# Patient Record
Sex: MALE | Race: WHITE | NOT HISPANIC OR LATINO | Employment: OTHER | URBAN - METROPOLITAN AREA
[De-identification: names, ages, dates, MRNs, and addresses within clinical notes are randomized per-mention and may not be internally consistent; named-entity substitution may affect disease eponyms.]

---

## 2019-10-01 ENCOUNTER — TELEPHONE (OUTPATIENT)
Dept: RHEUMATOLOGY | Facility: CLINIC | Age: 84
End: 2019-10-01

## 2019-10-01 ENCOUNTER — PATIENT MESSAGE (OUTPATIENT)
Dept: RHEUMATOLOGY | Facility: CLINIC | Age: 84
End: 2019-10-01

## 2019-10-01 NOTE — TELEPHONE ENCOUNTER
----- Message from Dania Philip sent at 10/1/2019  1:49 PM CDT -----  Contact: Pt  Pt is requesting call back in regards to questions about referral.          Pls call back at [phone]

## 2019-10-01 NOTE — TELEPHONE ENCOUNTER
Called pt back to inform him we can't do a 30 min visit has to be an hour. Pt verbalized understanding.

## 2019-10-07 ENCOUNTER — PATIENT MESSAGE (OUTPATIENT)
Dept: RHEUMATOLOGY | Facility: CLINIC | Age: 84
End: 2019-10-07

## 2019-10-08 ENCOUNTER — HOSPITAL ENCOUNTER (OUTPATIENT)
Dept: RADIOLOGY | Facility: HOSPITAL | Age: 84
Discharge: HOME | End: 2019-10-08
Attending: INTERNAL MEDICINE
Payer: MEDICARE

## 2019-10-08 ENCOUNTER — OFFICE VISIT (OUTPATIENT)
Dept: RHEUMATOLOGY | Facility: CLINIC | Age: 84
End: 2019-10-08
Payer: MEDICARE

## 2019-10-08 VITALS
BODY MASS INDEX: 21.36 KG/M2 | WEIGHT: 144.19 LBS | HEART RATE: 69 BPM | SYSTOLIC BLOOD PRESSURE: 124 MMHG | HEIGHT: 69 IN | DIASTOLIC BLOOD PRESSURE: 84 MMHG

## 2019-10-08 DIAGNOSIS — R76.8 RHEUMATOID FACTOR POSITIVE: Primary | ICD-10-CM

## 2019-10-08 DIAGNOSIS — M15.9 PRIMARY OSTEOARTHRITIS INVOLVING MULTIPLE JOINTS: ICD-10-CM

## 2019-10-08 DIAGNOSIS — R79.82 ELEVATED C-REACTIVE PROTEIN (CRP): ICD-10-CM

## 2019-10-08 PROCEDURE — 72040 XR CERVICAL SPINE AP LATERAL: ICD-10-PCS | Mod: 26,,, | Performed by: RADIOLOGY

## 2019-10-08 PROCEDURE — 99205 PR OFFICE/OUTPT VISIT, NEW, LEVL V, 60-74 MIN: ICD-10-PCS | Mod: S$GLB,,, | Performed by: INTERNAL MEDICINE

## 2019-10-08 PROCEDURE — 99999 PR PBB SHADOW E&M-EST. PATIENT-LVL III: CPT | Mod: PBBFAC,,, | Performed by: INTERNAL MEDICINE

## 2019-10-08 PROCEDURE — 72040 X-RAY EXAM NECK SPINE 2-3 VW: CPT | Mod: 26,,, | Performed by: RADIOLOGY

## 2019-10-08 PROCEDURE — 1100F PR PT FALLS ASSESS DOC 2+ FALLS/FALL W/INJURY/YR: ICD-10-PCS | Mod: CPTII,S$GLB,, | Performed by: INTERNAL MEDICINE

## 2019-10-08 PROCEDURE — 1100F PTFALLS ASSESS-DOCD GE2>/YR: CPT | Mod: CPTII,S$GLB,, | Performed by: INTERNAL MEDICINE

## 2019-10-08 PROCEDURE — 99205 OFFICE O/P NEW HI 60 MIN: CPT | Mod: S$GLB,,, | Performed by: INTERNAL MEDICINE

## 2019-10-08 PROCEDURE — 3288F PR FALLS RISK ASSESSMENT DOCUMENTED: ICD-10-PCS | Mod: CPTII,S$GLB,, | Performed by: INTERNAL MEDICINE

## 2019-10-08 PROCEDURE — 72040 X-RAY EXAM NECK SPINE 2-3 VW: CPT | Mod: TC

## 2019-10-08 PROCEDURE — 99999 PR PBB SHADOW E&M-EST. PATIENT-LVL III: ICD-10-PCS | Mod: PBBFAC,,, | Performed by: INTERNAL MEDICINE

## 2019-10-08 PROCEDURE — 3288F FALL RISK ASSESSMENT DOCD: CPT | Mod: CPTII,S$GLB,, | Performed by: INTERNAL MEDICINE

## 2019-10-08 RX ORDER — IPRATROPIUM BROMIDE 42 UG/1
2 SPRAY, METERED NASAL 2 TIMES DAILY PRN
COMMUNITY

## 2019-10-08 RX ORDER — GABAPENTIN 100 MG/1
100 CAPSULE ORAL NIGHTLY PRN
Qty: 30 CAPSULE | Refills: 3 | Status: SHIPPED | OUTPATIENT
Start: 2019-10-08 | End: 2019-11-07

## 2019-10-08 RX ORDER — APIXABAN 5 MG/1
5 TABLET, FILM COATED ORAL 2 TIMES DAILY
Refills: 11 | COMMUNITY
Start: 2019-09-04 | End: 2021-05-28

## 2019-10-08 NOTE — PATIENT INSTRUCTIONS
Gabapentin capsules or tablets  What is this medicine?  GABAPENTIN (GA ba pen tin) is used to control partial seizures in adults with epilepsy. It is also used to treat certain types of nerve pain.  How should I use this medicine?  Take this medicine by mouth with a glass of water. Follow the directions on the prescription label. You can take it with or without food. If it upsets your stomach, take it with food.Take your medicine at regular intervals. Do not take it more often than directed. Do not stop taking except on your doctor's advice.  If you are directed to break the 600 or 800 mg tablets in half as part of your dose, the extra half tablet should be used for the next dose. If you have not used the extra half tablet within 28 days, it should be thrown away.  A special MedGuide will be given to you by the pharmacist with each prescription and refill. Be sure to read this information carefully each time.  Talk to your pediatrician regarding the use of this medicine in children. Special care may be needed.  What side effects may I notice from receiving this medicine?  Side effects that you should report to your doctor or health care professional as soon as possible:  · allergic reactions like skin rash, itching or hives, swelling of the face, lips, or tongue  · worsening of mood, thoughts or actions of suicide or dying  Side effects that usually do not require medical attention (report to your doctor or health care professional if they continue or are bothersome):  · constipation  · difficulty walking or controlling muscle movements  · dizziness  · nausea  · slurred speech  · tiredness  · tremors  · weight gain  What may interact with this medicine?  Do not take this medicine with any of the following medications:  · other gabapentin products  This medicine may also interact with the following medications:  · alcohol  · antacids  · antihistamines for allergy, cough and cold  · certain medicines for anxiety or  sleep  · certain medicines for depression or psychotic disturbances  · homatropine; hydrocodone  · naproxen  · narcotic medicines (opiates) for pain  · phenothiazines like chlorpromazine, mesoridazine, prochlorperazine, thioridazine  What if I miss a dose?  If you miss a dose, take it as soon as you can. If it is almost time for your next dose, take only that dose. Do not take double or extra doses.  Where should I keep my medicine?  Keep out of reach of children.  This medicine may cause accidental overdose and death if it taken by other adults, children, or pets. Mix any unused medicine with a substance like cat litter or coffee grounds. Then throw the medicine away in a sealed container like a sealed bag or a coffee can with a lid. Do not use the medicine after the expiration date.  Store at room temperature between 15 and 30 degrees C (59 and 86 degrees F).  What should I tell my health care provider before I take this medicine?  They need to know if you have any of these conditions:  · kidney disease  · suicidal thoughts, plans, or attempt; a previous suicide attempt by you or a family member  · an unusual or allergic reaction to gabapentin, other medicines, foods, dyes, or preservatives  · pregnant or trying to get pregnant  · breast-feeding  What should I watch for while using this medicine?  Visit your doctor or health care professional for regular checks on your progress. You may want to keep a record at home of how you feel your condition is responding to treatment. You may want to share this information with your doctor or health care professional at each visit. You should contact your doctor or health care professional if your seizures get worse or if you have any new types of seizures. Do not stop taking this medicine or any of your seizure medicines unless instructed by your doctor or health care professional. Stopping your medicine suddenly can increase your seizures or their severity.  Wear a medical  identification bracelet or chain if you are taking this medicine for seizures, and carry a card that lists all your medications.  You may get drowsy, dizzy, or have blurred vision. Do not drive, use machinery, or do anything that needs mental alertness until you know how this medicine affects you. To reduce dizzy or fainting spells, do not sit or stand up quickly, especially if you are an older patient. Alcohol can increase drowsiness and dizziness. Avoid alcoholic drinks.  Your mouth may get dry. Chewing sugarless gum or sucking hard candy, and drinking plenty of water will help.  The use of this medicine may increase the chance of suicidal thoughts or actions. Pay special attention to how you are responding while on this medicine. Any worsening of mood, or thoughts of suicide or dying should be reported to your health care professional right away.  Women who become pregnant while using this medicine may enroll in the [place] Antiepileptic Drug Pregnancy Registry by calling 1-885.974.1358. This registry collects information about the safety of antiepileptic drug use during pregnancy.  NOTE:This sheet is a summary. It may not cover all possible information. If you have questions about this medicine, talk to your doctor, pharmacist, or health care provider. Copyright© 2017 [employer]        General Neck and Back Pain    Both neck and back pain are usually caused by injury to the muscles or ligaments of the spine. Sometimes the disks that separate each bone of the spine may cause pain by pressing on a nearby nerve. Back and neck pain may appear after a sudden twisting or bending force (such as in a car accident), or sometimes after a simple awkward movement. In either case, muscle spasm is often present and adds to the pain.  Acute neck and back pain usually gets better in 1 to 2 weeks. Pain related to disk disease, arthritis in the spinal joints or spinal stenosis (narrowing of the spinal canal) can become  chronic and last for months or years.  Back and neck pain are common problems. Most people feel better in 1 or 2 weeks, and most of the rest in 1 to 2 months. Most people can remain active.  People experience and describe pain differently.  · Pain can be sharp, stabbing, shooting, aching, cramping, or burning  · Movement, standing, bending, lifting, sitting, or walking may worsen the pain  · Pain can be localized to one spot or area, or it can be more generalized  · Pain can spread or radiate upwards, downwards, to the front, or go down your arms  · Muscle spasm may occur.  Most of the time mechanical problems with the muscles or spine cause the pain. it is usually caused by an injury, whether known or not, to the muscles or ligaments. While illnesses can cause back pain, it is usually not caused by a serious illness. Pain is usually related to physical activity, whether sports, exercise, work, or normal activity. Sometimes it can occur without an identifiable cause. This can happen simply by stretching or moving wrong, without noting pain at the time. Other causes include:  · Overexertion, lifting, pushing, pulling incorrectly or too aggressively.  · Sudden twisting, bending or stretching from an accident (car or fall), or accidental movement.  · Poor posture  · Poor conditioning, lack of regular exercise  · Spinal disc disease or arthritis  · Stress  · Pregnancy, or illness like appendicitis, bladder or kidney infection, pelvic infections   Home care  · For neck pain: Use a comfortable pillow that supports the head and keeps the spine in a neutral position. The position of the head should not be tilted forward or backward.  · When in bed, try to find a position of comfort. A firm mattress is best. Try lying flat on your back with pillows under your knees. You can also try lying on your side with your knees bent up towards your chest and a pillow between your knees.  · At first, do not try to stretch out the sore  spots. If there is a strain, it is not like the good soreness you get after exercising without an injury. In this case, stretching may make it worse.  · Avoid prolonged sitting, long car rides or travel. This puts more stress on the lower back than standing or walking.  · During the first 24 to 72 hours after an injury, apply an ice pack to the painful area for 20 minutes and then remove it for 20 minutes over a period of 60 to 90 minutes or several times a day.   · You can alternate ice and heat therapies. Talk with your healthcare provider about the best treatment for your back or neck pain. As a safety precaution, do not use a heating pad at bedtime. Sleeping with a heating pad can lead to skin burns or tissue damage.  · Therapeutic massage can help relax the back and neck muscles without stretching them.  · Be aware of safe lifting methods and do not lift anything over 15 pounds until all the pain is gone.  Medications  Talk to your healthcare provider before using medicine, especially if you have other medical problems or are taking other medicines.  · You may use over-the-counter medicine to control pain, unless another pain medicine was prescribed. If you have chronic conditions like diabetes, liver or kidney disease, stomach ulcers,  gastrointestinal bleeding, or are taking blood thinner medicines.  · Be careful if you are given pain medicines, narcotics, or medicine for muscle spasm. They can cause drowsiness, and can affect your coordination, reflexes, and judgment. Do not drive or operate heavy machinery.  Follow-up care  Follow up with your healthcare provider, or as advised. Physical therapy or further tests may be needed.  If X-rays were taken, you will be notified of any new findings that may affect your care.  Call 911  Seek emergency medical care if any of the following occur:  · Trouble breathing  · Confusion  · Very drowsy or trouble awakening  · Fainting or loss of consciousness  · Rapid or very  slow heart rate  · Loss of bowel or bladder control  When to seek medical advice  Call your healthcare provider right away if any of these occur:  · Pain becomes worse or spreads into your arms or legs  · Weakness, numbness or pain in one or both arms or legs  · Numbness in the groin area  · Difficulty walking  · Fever of 100.4ºF (38ºC) or higher, or as directed by your healthcare provider  Date Last Reviewed: 7/1/2016 © 2000-2017 [employer]. [address] [place] [address]. All rights reserved. This information is not intended as a substitute for professional medical care. Always follow your healthcare professional's instructions.        Understanding Polymyalgia Rheumatica  Polymyalgia rheumatica (PMR) is an inflammatory condition that can cause aching and stiffness. It tends to affect the neck, shoulders, and hips. The aching and stiffness are usually worse in the morning.  PMR can come on suddenly. For some it seems to occur overnight. For others it can take days or weeks to develop. PMR affects only older adults. It becomes more common with age. PMR occurs most often between the ages of 70 and 80. It is more common in women than in men, and it seems to run in some families.  What causes polymyalgia rheumatica?  Researchers are working to understand the causes of PMR. Because it can happen quickly and tends to occur at certain times of year, some think that an infection may cause it. Genes may be part of the cause. PMR can run in some families.  Symptoms of polymyalgia rheumatica  The main symptoms of PMR are aching and stiffness of the shoulders, neck, and hips. The aching can extend to the upper arms and thighs. PMR tends to affect both sides of the body equally. Symptoms are often worse in the morning or after long periods of no activity. Movement can make the pain worse.  The symptoms of PMR usually affect the shoulders the most. You may have trouble raising your arms above the  level of your shoulders. This can make it hard to get dressed. You may have trouble rolling over in bed, getting out of bed, and getting up from sitting. You may also have trouble sleeping because of your symptoms.  Other symptoms can occur, such as:  · Swelling of the hands, wrists, feet, and ankles  · Numbness, tingling, or pain in the hand, wrist, or forearm  · Feeling of weakness  · General feeling of being unwell  · Feeling tired  · Loss of appetite  · Weight loss  · Low-grade fever  Diagnosing polymyalgia rheumatica  Your healthcare provider will ask about your health history and your symptoms. He or she will give you a physical exam. The exam will check your range of motion, strength, and painful areas.  Diagnosing PMR can be difficult. Your healthcare provider will need to make sure you have PMR. Many conditions can cause aching and stiffness. These include rheumatoid arthritis and fibromyalgia. You may need tests, such as:  · Blood tests to look for signs of inflammation, blood count problems, and muscle damage  · Muscle biopsy to check for damage  · Biopsy of a blood vessel in your temple  · X-rays to look at your joints  · MRI for detailed pictures of your joints and tissues  · Ultrasound to look most closely at the soft tissues around your joints  Your healthcare provider may also diagnose you by giving you medicine. PMR often responds quickly to steroid medicine. This can help show if you have PMR. You may also be referred to a rheumatologist for diagnosis.  Date Last Reviewed: 6/10/2015  © 7112-6395 [employer]. [address] [place] [address], [place] [address]. All rights reserved. This information is not intended as a substitute for professional medical care. Always follow your healthcare professional's instructions.        Treatment for Polymyalgia Rheumatica  Polymyalgia rheumatica (PMR) is an inflammatory condition that can cause aching and stiffness. It tends to affect the neck, shoulders,  and hips. The aching and stiffness are usually worse in the morning.  Types of treatment  Steroid medicine is the main treatment for PMR. Your healthcare provider will start you on a low dose of this medicine. You should start to feel better soon after starting. When your symptoms are better, your healthcare provider will slowly lower the amount of medicine. If your symptoms return, he or she will increase the dose. You may need to take steroid medicine for a few years. Return of symptoms is common, so you may need to take steroid medicine again in the future. If untreated, PMR may go away on its own after several years. But symptoms will likely return.  Watching for giant cell arteritis  Some people with PMR also have a condition called giant cell arteritis. It is also called temporal arteritis or Painter arteritis. This is inflammation of blood vessels in the head, neck, and arms. This can narrow or block the blood vessels. It can cause problems from less blood flow through those vessels. Giant cell arteritis can cause symptoms such as:  · Headaches  · Changes in vision  · Jaw pain, especially when chewing  · Scalp pain  · Scalp sores (ulcers)  · High fevers  Possible complications of giant cell arteritis may include blindness or stroke. Giant cell arteritis can also be treated with steroid medicine.  Risks of long-term steroid use  Steroid medicine has some risks. Talk with your healthcare provider about the risks and benefits for you. Some of the possible risks of taking steroids for a long time can include:  · High blood pressure  · Diabetes  · Glaucoma  · Cataracts  · Osteoporosis  · Fluid retention  · Weight gain  · Roundness of the face  · Stomach irritation  · Trouble sleeping  · Muscle wasting  · Skin thinning  · Easy bruising  · Poor wound healing  · Higher risk for infections  Living with polymyalgia rheumatica  If you have PMR, your symptoms will get better with treatment. Once you start feeling better,  you can return to your normal activities. Your healthcare provider will track your symptoms and adjust your steroid dose until you are on the lowest dose needed. Small changes in steroid doses can have a big effect on your symptoms. Make sure to follow your healthcare providers instructions.  When to call 911  Call 911 if you have symptoms of a stroke, such as:  · Severe headache  · Trouble seeing  · Balance or coordination problems  · Weakness or numbness  · Confusion  · Trouble speaking  If you think you are having a stroke, note the time when your symptoms started.      When to call your healthcare provider  Call your healthcare provider if you have any of the following:  · Symptoms that dont get better with treatment  · Symptoms that get worse  · Symptoms of giant cell arteritis   Date Last Reviewed: 8/10/2015  © 7225-8556 [employer]. [address] [place] [address], [place] [address]. All rights reserved. This information is not intended as a substitute for professional medical care. Always follow your healthcare professional's instructions.

## 2019-10-08 NOTE — LETTER
October 8, 2019      Issac Edgar Jr., MD  [address] [facility]  [place] [address]           [facility]  [address] [facility]  [place] [address]  Phone: [phone]  Fax: [phone]          Patient: Mars Armenta   MR Number: [id number]   YOB: 1931   Date of Visit: 10/8/2019       Dear Dr. Issac Edgar Jr.:    Thank you for referring Mars Armenta to me for evaluation. Attached you will find relevant portions of my assessment and plan of care.    If you have questions, please do not hesitate to call me. I look forward to following Mars Armenta along with you.    Sincerely,    Bola Jolley MD    Enclosure  CC:  No Recipients    If you would like to receive this communication electronically, please contact [email or web address] or [phone] to request more information on [employer] [facility] access.    For providers and/or their staff who would like to refer a patient to [facility], please contact us through our one-stop-shop provider referral line, [facility], at [phone].    If you feel you have received this communication in error or would no longer like to receive these types of communications, please e-mail [email or web address]

## 2019-10-08 NOTE — PROGRESS NOTES
"     RHEUMATOLOGY OUTPATIENT CLINIC NOTE    10/8/2019    Attending Rheumatologist: Bola Jolley  Primary Care Provider: Issac Edgar Jr, MD   Physician Requesting Consultation: Issac Edgar Jr., MD  [address] [facility]  [facility]  [place] [id number]  Chief Complaint/Reason For Consultation:  Joint pain.    Subjective:       HPI  Mars Armenta is a 88 y.o. White male with episode of neck pain and left upper extremity swelling comes for rheumatic evaluation.  Describes persistent episode of neck pain with "pain and needles characteristic" with radiation down his left arm.  Worst in the evening, aggravated by range of motion, improved somewhat by rest without significant relief from OTC pain medication.  Denies association with prolonged morning stiffness, jaw claudication, fever, or persistent paresthesias/other focal neurological symptoms.     Review of Systems   Constitutional: Negative for chills, fever and malaise/fatigue.   Eyes: Negative for pain and redness.   Respiratory: Negative for cough, hemoptysis and shortness of breath.    Cardiovascular: Negative for chest pain and leg swelling.   Gastrointestinal: Negative for abdominal pain, blood in stool and melena.   Genitourinary: Negative for dysuria and hematuria.   Musculoskeletal: Positive for joint pain (left shoulder, mixed pattern mostly consistent with DJD.) and neck pain (neuropathic features, No alarm signs or symptoms.). Negative for falls.   Skin: Negative for rash.   Neurological: Negative for tingling, focal weakness and weakness.   Psychiatric/Behavioral: Negative for memory loss. The patient does not have insomnia.      Chronic comorbid conditions affecting medical decision making today:  · Osteoporosis on Boniva per PMD  · TIA?  · On chronic AC with Eliquis  · Hx of lumbar  Laminectomy  · Prostate CA    History reviewed. No pertinent past medical history.  History reviewed. No pertinent surgical history.  History reviewed. No " "pertinent family history.  Social History     Substance and Sexual Activity   Alcohol Use Never    Frequency: Never     Social History     Tobacco Use   Smoking Status Never Smoker     Social History     Substance and Sexual Activity   Drug Use Not on file       Current Outpatient Medications:     ELIQUIS 5 mg Tab, Take 5 mg by mouth 2 (two) times daily., Disp: , Rfl: 11    ipratropium (ATROVENT) 42 mcg (0.06 %) nasal spray, 2 sprays by Nasal route 2 (two) times daily as needed for Rhinitis., Disp: , Rfl:     tetrahydrozoline HCl/zinc sulf (EYE DROPS IRRITATION RELIEF OPHT), Apply to eye., Disp: , Rfl:     gabapentin (NEURONTIN) 100 MG capsule, Take 1 capsule (100 mg total) by mouth nightly as needed., Disp: 30 capsule, Rfl: 3     Objective:         Vitals:    10/08/19 1425   BP: 124/84   Pulse: 69     Physical Exam   Constitutional: No distress.   Estimated body mass index is 21.29 kg/m² as calculated from the following:    Height as of this encounter: 5' 9" (1.753 m).    Weight as of this encounter: 65.4 kg (144 lb 2.9 oz).    Wt Readings from Last 1 Encounters:  10/08/19 1425 : 65.4 kg (144 lb 2.9 oz)     HENT:   Head: Normocephalic and atraumatic.   Eyes: Conjunctivae are normal. Pupils are equal, round, and reactive to light.   Neck: Normal range of motion.   Cardiovascular: Normal rate and intact distal pulses.    Pulmonary/Chest: Effort normal. No respiratory distress.   Abdominal: Soft. He exhibits no distension.   Neurological: He is alert. Gait normal.   Skin: No rash noted. No erythema.     Musculoskeletal: Normal range of motion. He exhibits deformity (Slight Erinn's.  Thumb CMC squaring.  Slight Boutonnière appearance 5th PIP right more than left.). He exhibits no edema or tenderness.   : strong  No active synovitis or significant squeeze tenderness    AROM: intact  PROM: intact    Devices used by patient: none       Reviewed old and all outside pertinent medical records available.    All " lab results personally reviewed and interpreted by me.  Lab Results   Component Value Date    WBC 4.54 (L) 07/18/2006    HGB 15.5 07/18/2006    HCT 47.3 07/18/2006    MCV 94.6 07/18/2006    MCH 31.0 07/18/2006    MCHC 32.8 07/18/2006    RDW 14.4 07/18/2006     07/18/2006    MPV 11.5 07/18/2006       Lab Results   Component Value Date     08/30/2006    K 4.8 08/30/2006     08/30/2006    CO2 26 08/30/2006    GLU 91 08/30/2006    BUN 20 08/30/2006    CALCIUM 9.5 08/30/2006    PROT 7.0 07/18/2006    ALBUMIN 4.6 07/18/2006    BILITOT 0.9 07/18/2006    AST 37 07/18/2006    ALKPHOS 83 07/18/2006    ALT 39 07/18/2006       No results found for: COLORU, APPEARANCEUA, SPECGRAV, PHUR, PROTEINUA, GLUCOSEU, KETONESU, BLOODU, LEUKOCYTESUR, NITRITE, UROBILINOGEN    No results found for: CRP    Lab Results   Component Value Date    SEDRATE 1 12/07/2005       Lab Results   Component Value Date    SEDRATE 1 12/07/2005       No components found for: 25OHVITDTOT, 90HSOJDJ9, 23GRVJYJ7, METHODNOTE    No results found for: URICACID    No components found for: TSPOTTB    (provided by patient 8/2019)  CBC, CMP: grossly unremarkable    Rheum Labs:   HELEN negative    (ref <14)   CCP negative   ESR 33   .7     Infectious Labs:   n/a     Imaging:  All imaging reviewed and independently  interpreted by me.    n/a     ASSESSMENT / PLAN:     Mars Armenta is a 88 y.o. White male with:    1.  Abnormal lab results  - Elevated rheumatoid factor and CRP ( provided by patient)  - Currently with no features of active inflammatory arthritis  - Mixed pattern of joint pain, mostly consistent with degenerative disc disease with radiculopathy features  - Describes prescribed systemic CS, last taken 1 week ago approximately with good results  - given current presentation, will not resume PDN or provide with DMARDs at this time  - if re-occurrence of joint swelling or severe pain, will repeat APRs and consider DMARDs  -  symptomatic management for neck pain with radiculopathy features  - trial of low-dose gabapentin p.r.n. Clinical significance side effects of therapy discussed in detail.  - imaging to assess for chronic inflammatory arthritic changes  - gabapentin (NEURONTIN) 100 MG capsule; Take 1 capsule (100 mg total) by mouth nightly as needed.  Dispense: 30 capsule; Refill: 3  - X-Ray Cervical Spine AP And Lateral; Future    2. Other specified counseling  - over 10 minutes spent regarding below topics:  - Immunization counseling done.  - Weight loss counseling done.  - Nutrition and exercise counseling.  - Limitation of alcohol consumption.  - Regular exercise:  Aerobic and resistance.  - Medication counseling provided.    Follow up in about 3 months (around 1/8/2020).    Method of contact with patient concerns: [facility]    Disclaimer:  This note is prepared using voice recognition software and as such is likely to have errors and has not been proof read. Please contact me for questions.     Time spent: 60 minutes in face to face discussion concerning diagnosis, prognosis, review of lab and test results, benefits of treatment as well as management of disease, counseling of patient and coordination of care between various health care providers.  Greater than half the time spent was used for coordination of care and counseling of patient.    Bola Jolley M.D.  Rheumatology Department   [facility]

## 2019-10-09 ENCOUNTER — TELEPHONE (OUTPATIENT)
Dept: RHEUMATOLOGY | Facility: CLINIC | Age: 84
End: 2019-10-09

## 2019-10-09 NOTE — TELEPHONE ENCOUNTER
Called pt and advised him imaging shows degenerative disc disease and he should continue the plan as discussed with Dr. Jolley. Pt verbalized understanding.

## 2019-10-09 NOTE — TELEPHONE ENCOUNTER
----- Message from Bola Jolley MD sent at 10/9/2019 11:30 AM CDT -----  Please contact the patient and inform I reviewed the imaging.  Findings mostly consistent with degenerative disc disease.  We can continue with the plan discussed on our previous encounter.  For any questions or concerns, do not hesitate to contact me; and have the patient call the office or send a message through the patient portal for any concerns.    Thank you very much!    Sincerely,    Bola Jolley  Rheumatology Department   [facility]

## 2019-11-14 ENCOUNTER — PATIENT MESSAGE (OUTPATIENT)
Dept: RHEUMATOLOGY | Facility: CLINIC | Age: 84
End: 2019-11-14

## 2020-01-02 ENCOUNTER — TELEPHONE (OUTPATIENT)
Dept: RHEUMATOLOGY | Facility: CLINIC | Age: 85
End: 2020-01-02

## 2020-01-02 NOTE — TELEPHONE ENCOUNTER
Spoke with patient. States that he had labs 2 wks ago by his Orthopedists. States he will bring copy for his appointment next week .

## 2020-01-02 NOTE — TELEPHONE ENCOUNTER
----- Message from Tomasz Fung sent at 1/2/2020  8:43 AM CST -----  Contact: self [phone]  Pt would like return call from nurse regarding labs.   Please call back at [phone].  Md Ritchie

## 2020-01-07 ENCOUNTER — OFFICE VISIT (OUTPATIENT)
Dept: RHEUMATOLOGY | Facility: CLINIC | Age: 85
End: 2020-01-07
Payer: MEDICARE

## 2020-01-07 VITALS
SYSTOLIC BLOOD PRESSURE: 120 MMHG | WEIGHT: 149.94 LBS | BODY MASS INDEX: 22.21 KG/M2 | OXYGEN SATURATION: 99 % | HEART RATE: 60 BPM | HEIGHT: 69 IN | DIASTOLIC BLOOD PRESSURE: 60 MMHG

## 2020-01-07 DIAGNOSIS — M05.9 SEROPOSITIVE RHEUMATOID ARTHRITIS: Primary | ICD-10-CM

## 2020-01-07 DIAGNOSIS — Z71.89 COUNSELING ON HEALTH PROMOTION AND DISEASE PREVENTION: ICD-10-CM

## 2020-01-07 DIAGNOSIS — M15.9 PRIMARY OSTEOARTHRITIS INVOLVING MULTIPLE JOINTS: ICD-10-CM

## 2020-01-07 PROCEDURE — 99999 PR PBB SHADOW E&M-EST. PATIENT-LVL III: ICD-10-PCS | Mod: PBBFAC,,, | Performed by: INTERNAL MEDICINE

## 2020-01-07 PROCEDURE — 3288F FALL RISK ASSESSMENT DOCD: CPT | Mod: CPTII,S$GLB,, | Performed by: INTERNAL MEDICINE

## 2020-01-07 PROCEDURE — 1125F PR PAIN SEVERITY QUANTIFIED, PAIN PRESENT: ICD-10-PCS | Mod: S$GLB,,, | Performed by: INTERNAL MEDICINE

## 2020-01-07 PROCEDURE — 1100F PTFALLS ASSESS-DOCD GE2>/YR: CPT | Mod: CPTII,S$GLB,, | Performed by: INTERNAL MEDICINE

## 2020-01-07 PROCEDURE — 99214 PR OFFICE/OUTPT VISIT, EST, LEVL IV, 30-39 MIN: ICD-10-PCS | Mod: S$GLB,,, | Performed by: INTERNAL MEDICINE

## 2020-01-07 PROCEDURE — 1125F AMNT PAIN NOTED PAIN PRSNT: CPT | Mod: S$GLB,,, | Performed by: INTERNAL MEDICINE

## 2020-01-07 PROCEDURE — 3288F PR FALLS RISK ASSESSMENT DOCUMENTED: ICD-10-PCS | Mod: CPTII,S$GLB,, | Performed by: INTERNAL MEDICINE

## 2020-01-07 PROCEDURE — 1100F PR PT FALLS ASSESS DOC 2+ FALLS/FALL W/INJURY/YR: ICD-10-PCS | Mod: CPTII,S$GLB,, | Performed by: INTERNAL MEDICINE

## 2020-01-07 PROCEDURE — 99214 OFFICE O/P EST MOD 30 MIN: CPT | Mod: S$GLB,,, | Performed by: INTERNAL MEDICINE

## 2020-01-07 PROCEDURE — 99999 PR PBB SHADOW E&M-EST. PATIENT-LVL III: CPT | Mod: PBBFAC,,, | Performed by: INTERNAL MEDICINE

## 2020-01-07 PROCEDURE — 1159F PR MEDICATION LIST DOCUMENTED IN MEDICAL RECORD: ICD-10-PCS | Mod: S$GLB,,, | Performed by: INTERNAL MEDICINE

## 2020-01-07 PROCEDURE — 1159F MED LIST DOCD IN RCRD: CPT | Mod: S$GLB,,, | Performed by: INTERNAL MEDICINE

## 2020-01-07 RX ORDER — GABAPENTIN 100 MG/1
CAPSULE ORAL
COMMUNITY
Start: 2019-12-30 | End: 2021-05-28

## 2020-01-07 RX ORDER — PREDNISONE 5 MG/1
5 TABLET ORAL DAILY PRN
Qty: 60 TABLET | Refills: 1 | Status: SHIPPED | OUTPATIENT
Start: 2020-01-07 | End: 2020-02-06

## 2020-01-07 RX ORDER — HYDROXYCHLOROQUINE SULFATE 200 MG/1
200 TABLET, FILM COATED ORAL NIGHTLY
Qty: 60 TABLET | Refills: 1 | Status: SHIPPED | OUTPATIENT
Start: 2020-01-07 | End: 2020-02-06

## 2020-01-07 RX ORDER — LATANOPROST 50 UG/ML
1 SOLUTION/ DROPS OPHTHALMIC NIGHTLY
COMMUNITY
Start: 2019-12-21

## 2020-01-07 NOTE — PATIENT INSTRUCTIONS
Rheumatology medications:    · Plaquenil: 200mg 1 tablet in the evening  · Prednisone: 5mg tablets-> may use 1-2 tablets daily as needed for joint pain and swelling.  · Gabapentin: 100mg in the evening as needed for neck/back pain      Methotrexate tablets  What is this medicine?  METHOTREXATE (METH oh TREX ate) is a chemotherapy drug used to treat cancer including breast cancer, leukemia, and lymphoma. This medicine can also be used to treat psoriasis and certain kinds of arthritis.  How should I use this medicine?  Take this medicine by mouth with a glass of water. Follow the directions on the prescription label. Take your medicine at regular intervals. Do not take it more often than directed. Do not stop taking except on your doctor's advice.  Make sure you know why you are taking this medicine and how often you should take it. If this medicine is used for a condition that is not cancer, like arthritis or psoriasis, it should be taken weekly, NOT daily. Taking this medicine more often than directed can cause serious side effects, even death.  Talk to your healthcare provider about safe handling and disposal of this medicine. You may need to take special precautions.  Talk to your pediatrician regarding the use of this medicine in children. While this drug may be prescribed for selected conditions, precautions do apply.  What side effects may I notice from receiving this medicine?  Side effects that you should report to your doctor or health care professional as soon as possible:  · allergic reactions like skin rash, itching or hives, swelling of the face, lips, or tongue  · breathing problems or shortness of breath  · diarrhea  · dry, nonproductive cough  · low blood counts - this medicine may decrease the number of white blood cells, red blood cells and platelets. You may be at increased risk for infections and bleeding.  · mouth sores  · redness, blistering, peeling or loosening of the skin, including inside  the mouth  · signs of infection - fever or chills, cough, sore throat, pain or trouble passing urine  · signs and symptoms of bleeding such as bloody or black, tarry stools; red or dark-brown urine; spitting up blood or brown material that looks like coffee grounds; red spots on the skin; unusual bruising or bleeding from the eye, gums, or nose  · signs and symptoms of kidney injury like trouble passing urine or change in the amount of urine  · signs and symptoms of liver injury like dark yellow or brown urine; general ill feeling or flu-like symptoms; light-colored stools; loss of appetite; nausea; right upper belly pain; unusually weak or tired; yellowing of the eyes or skin  Side effects that usually do not require medical attention (report to your doctor or health care professional if they continue or are bothersome):  · dizziness  · hair loss  · tiredness  · upset stomach  · vomiting  What may interact with this medicine?  This medicine may interact with the following medication:  · acitretin  · aspirin and aspirin-like medicines including salicylates  · azathioprine  · certain antibiotics like penicillins, tetracycline, and chloramphenicol  · cyclosporine  · gold  · hydroxychloroquine  · live virus vaccines  · NSAIDs, medicines for pain and inflammation, like ibuprofen or naproxen  · other cytotoxic agents  · penicillamine  · phenylbutazone  · phenytoin  · probenecid  · retinoids such as isotretinoin and tretinoin  · steroid medicines like prednisone or cortisone  · sulfonamides like sulfasalazine and trimethoprim/sulfamethoxazole  · theophylline  What if I miss a dose?  If you miss a dose, talk with your doctor or health care professional. Do not take double or extra doses.  Where should I keep my medicine?  Keep out of the reach of children.  Store at room temperature between 20 and 25 degrees C (68 and 77 degrees F). Protect from light. Throw away any unused medicine after the expiration date.  What should  I tell my health care provider before I take this medicine?  They need to know if you have any of these conditions:  · fluid in the stomach area or lungs  · if you often drink alcohol  · infection or immune system problems  · kidney disease or on hemodialysis  · liver disease  · low blood counts, like low white cell, platelet, or red cell counts  · lung disease  · radiation therapy  · stomach ulcers  · ulcerative colitis  · an unusual or allergic reaction to methotrexate, other medicines, foods, dyes, or preservatives  · pregnant or trying to get pregnant  · breast-feeding  What should I watch for while using this medicine?  Avoid alcoholic drinks.  This medicine can make you more sensitive to the sun. Keep out of the sun. If you cannot avoid being in the sun, wear protective clothing and use sunscreen. Do not use sun lamps or tanning beds/booths.  You may need blood work done while you are taking this medicine.  Call your doctor or health care professional for advice if you get a fever, chills or sore throat, or other symptoms of a cold or flu. Do not treat yourself. This drug decreases your body's ability to fight infections. Try to avoid being around people who are sick.  This medicine may increase your risk to bruise or bleed. Call your doctor or health care professional if you notice any unusual bleeding.  Check with your doctor or health care professional if you get an attack of severe diarrhea, nausea and vomiting, or if you sweat a lot. The loss of too much body fluid can make it dangerous for you to take this medicine.  Talk to your doctor about your risk of cancer. You may be more at risk for certain types of cancers if you take this medicine.  Both men and women must use effective birth control with this medicine. Do not become pregnant while taking this medicine or until at least 1 normal menstrual cycle has occurred after stopping it. Women should inform their doctor if they wish to become pregnant or  think they might be pregnant. Men should not father a child while taking this medicine and for 3 months after stopping it. There is a potential for serious side effects to an unborn child. Talk to your health care professional or pharmacist for more information. Do not breast-feed an infant while taking this medicine.  NOTE:This sheet is a summary. It may not cover all possible information. If you have questions about this medicine, talk to your doctor, pharmacist, or health care provider. Copyright© 2017 [employer]        Prednisone delayed-release tablets  What is this medicine?  PREDNISONE (PRED ni sone) is a corticosteroid. It is commonly used to treat inflammation of the skin, joints, lungs, and other organs. Common conditions treated include asthma, allergies, and arthritis. It is also used for other conditions, such as blood disorders and diseases of the adrenal glands.  How should I use this medicine?  Take this medicine by mouth with a glass of water. Follow the directions on the prescription label. Take this medicine with food. Do not cut, crush or chew this medicine. Do not suddenly stop taking your medicine because you may develop a severe reaction. If your doctor wants you to stop the medicine, the dose may be slowly lowered over time to avoid any side effects.  Talk to your pediatrician regarding the use of this medicine in children. Special care may be needed.  What side effects may I notice from receiving this medicine?  Side effects that you should report to your doctor or health care professional as soon as possible:  · allergic reactions like skin rash, itching or hives, swelling of the face, lips, or tongue  · changes in emotions or moods  · changes in vision  · depressed mood  · eye pain  · fever or chills, cough, sore throat, pain or difficulty passing urine  · increased thirst  · swelling of ankles, feet  Side effects that usually do not require medical attention (Report these to your  doctor or health care professional if they continue or are bothersome.):  · confusion, excitement, restlessness  · headache  · nausea, vomiting  · skin problems, acne, thin and shiny skin  · trouble sleeping  · weight gain  What may interact with this medicine?  Do not take this medicine with any of the following medications:  · metyrapone  · mifepristone  This medicine may also interact with the following medications:  · aminoglutethimide  · amphotericin B  · aspirin and aspirin-like medicines  · barbiturates  · certain medicines for diabetes, like glipizide or glyburide  · cholestyramine  · cholinesterase inhibitors  · cyclosporine  · digoxin  · diuretics  · ephedrine  · female hormones, like estrogens and birth control pills  · isoniazid  · ketoconazole  · NSAIDS, medicines for pain and inflammation, like ibuprofen or naproxen  · phenytoin  · rifampin  · toxoids  · vaccines  · warfarin  What if I miss a dose?  If you miss a dose, take it as soon as you can. If it is almost time for your next dose, talk to your doctor or health care professional. You may need to miss a dose or take an extra dose. Do not take double or extra doses without advice.  Where should I keep my medicine?  Keep out of the reach of children.  Store at room temperature between 15 and 30 degrees C (59 and 86 degrees F). Protect from light and moisture. Keep container tightly closed. Throw away any unused medicine after the expiration date.  What should I tell my health care provider before I take this medicine?  They need to know if you have any of these conditions:  · Cushing's syndrome  · diabetes  · glaucoma  · heart disease  · high blood pressure  · infection (especially a virus infection such as chickenpox, cold sores, or herpes)  · kidney disease  · liver disease  · mental illness  · myasthenia gravis  · osteoporosis  · seizures  · stomach or intestine problems  · thyroid disease  · an unusual or allergic reaction to lactose, prednisone,  other medicines, foods, dyes, or preservatives  · pregnant or trying to get pregnant  · breast-feeding  What should I watch for while using this medicine?  Visit your doctor or health care professional for regular checks on your progress. If you are taking this medicine over a prolonged period, carry an identification card with your name and address, the type and dose of your medicine, and your doctor's name and address.  This medicine may increase your risk of getting an infection. Tell your doctor or health care professional if you are around anyone with measles or chickenpox, or if you develop sores or blisters that do not heal properly.  If you are going to have surgery, tell your doctor or health care professional that you have taken this medicine within the last twelve months.  Ask your doctor or health care professional about your diet. You may need to lower the amount of salt you eat.  This medicine may affect blood sugar levels. If you have diabetes, check with your doctor or health care professional before you change your diet or the dose of your diabetic medicine.  NOTE:This sheet is a summary. It may not cover all possible information. If you have questions about this medicine, talk to your doctor, pharmacist, or health care provider. Copyright© 2017 [employer]        Prednisone tablets  What is this medicine?  PREDNISONE (PRED ni sone) is a corticosteroid. It is commonly used to treat inflammation of the skin, joints, lungs, and other organs. Common conditions treated include asthma, allergies, and arthritis. It is also used for other conditions, such as blood disorders and diseases of the adrenal glands.  How should I use this medicine?  Take this medicine by mouth with a glass of water. Follow the directions on the prescription label. Take this medicine with food. If you are taking this medicine once a day, take it in the morning. Do not take more medicine than you are told to take. Do not suddenly  stop taking your medicine because you may develop a severe reaction. Your doctor will tell you how much medicine to take. If your doctor wants you to stop the medicine, the dose may be slowly lowered over time to avoid any side effects.  Talk to your pediatrician regarding the use of this medicine in children. Special care may be needed.  What side effects may I notice from receiving this medicine?  Side effects that you should report to your doctor or health care professional as soon as possible:  · allergic reactions like skin rash, itching or hives, swelling of the face, lips, or tongue  · changes in emotions or moods  · changes in vision  · depressed mood  · eye pain  · fever or chills, cough, sore throat, pain or difficulty passing urine  · increased thirst  · swelling of ankles, feet  Side effects that usually do not require medical attention (report to your doctor or health care professional if they continue or are bothersome):  · confusion, excitement, restlessness  · headache  · nausea, vomiting  · skin problems, acne, thin and shiny skin  · trouble sleeping  · weight gain  What may interact with this medicine?  Do not take this medicine with any of the following medications:  · metyrapone  · mifepristone  This medicine may also interact with the following medications:  · aminoglutethimide  · amphotericin B  · aspirin and aspirin-like medicines  · barbiturates  · certain medicines for diabetes, like glipizide or glyburide  · cholestyramine  · cholinesterase inhibitors  · cyclosporine  · digoxin  · diuretics  · ephedrine  · female hormones, like estrogens and birth control pills  · isoniazid  · ketoconazole  · NSAIDS, medicines for pain and inflammation, like ibuprofen or naproxen  · phenytoin  · rifampin  · toxoids  · vaccines  · warfarin  What if I miss a dose?  If you miss a dose, take it as soon as you can. If it is almost time for your next dose, talk to your doctor or health care professional. You may  need to miss a dose or take an extra dose. Do not take double or extra doses without advice.  Where should I keep my medicine?  Keep out of the reach of children.  Store at room temperature between 15 and 30 degrees C (59 and 86 degrees F). Protect from light. Keep container tightly closed. Throw away any unused medicine after the expiration date.  What should I tell my health care provider before I take this medicine?  They need to know if you have any of these conditions:  · Cushing's syndrome  · diabetes  · glaucoma  · heart disease  · high blood pressure  · infection (especially a virus infection such as chickenpox, cold sores, or herpes)  · kidney disease  · liver disease  · mental illness  · myasthenia gravis  · osteoporosis  · seizures  · stomach or intestine problems  · thyroid disease  · an unusual or allergic reaction to lactose, prednisone, other medicines, foods, dyes, or preservatives  · pregnant or trying to get pregnant  · breast-feeding  What should I watch for while using this medicine?  Visit your doctor or health care professional for regular checks on your progress. If you are taking this medicine over a prolonged period, carry an identification card with your name and address, the type and dose of your medicine, and your doctor's name and address.  This medicine may increase your risk of getting an infection. Tell your doctor or health care professional if you are around anyone with measles or chickenpox, or if you develop sores or blisters that do not heal properly.  If you are going to have surgery, tell your doctor or health care professional that you have taken this medicine within the last twelve months.  Ask your doctor or health care professional about your diet. You may need to lower the amount of salt you eat.  This medicine may affect blood sugar levels. If you have diabetes, check with your doctor or health care professional before you change your diet or the dose of your diabetic  medicine.  NOTE:This sheet is a summary. It may not cover all possible information. If you have questions about this medicine, talk to your doctor, pharmacist, or health care provider. Copyright© 2017 [employer]        Hydroxychloroquine tablets  What is this medicine?  HYDROXYCHLOROQUINE (iron drox ee KLOR oh kwin) is used to treat rheumatoid arthritis and systemic lupus erythematosus. It is also used to treat malaria.  How should I use this medicine?  Take this medicine by mouth with a glass of water. Follow the directions on the prescription label. If this medicine upsets your stomach take it with food or milk. Take your doses at regular intervals. Do not take your medicine more often than directed.  Talk to your pediatrician regarding the use of this medicine in children. Special care may be needed.  What side effects may I notice from receiving this medicine?  Side effects that you should report to your doctor or health care professional as soon as possible:  · allergic reactions like skin rash, itching or hives, swelling of the face, lips, or tongue  · change in vision  · fever, infection  · hearing loss or ringing  · muscle weakness, tremor, or numbness  · redness, blistering, peeling or loosening of the skin, including inside the mouth  · seizures  · unusual bleeding or bruising  · unusually weak or tired  Side effects that usually do not require medical attention (report to your doctor or health care professional if they continue or are bothersome):  · change in coloration of the mouth or skin  · dizziness  · hair loss, lightening  · headache  · irritability, nervousness, nightmares  · loss of appetite  · stomach upset, diarrhea  What may interact with this medicine?  · antacids  · botulinum toxins  · digoxin  · kaolin  · penicillamine  What if I miss a dose?  If you miss a dose, take it as soon as you can. If it is almost time for your next dose, take only that dose. Do not take double or extra  doses.  Where should I keep my medicine?  Keep out of the reach of children. In children, this medicine can cause overdose with small doses.  Store at room temperature between 15 and 30 degrees C (59 and 86 degrees F). Protect from moisture and light. Throw away any unused medicine after the expiration date.  What should I tell my health care provider before I take this medicine?  They need to know if you have any of these conditions:  · alcoholism  · anemia or other blood disorder  · eye disease  · glucose 6-phosphate dehydrogenase (G6PD) deficiency  · liver disease  · porphyria  · psoriasis  · an unusual or allergic reaction to chloroquine, hydroxychloroquine, other medicines, foods, dyes, or preservatives  · pregnant or trying to get pregnant  · breast-feeding  What should I watch for while using this medicine?  Visit your doctor or health care professional for regular check ups. Tell your doctor if your symptoms do not improve. Arthritis symptoms may take several weeks to improve. If you are taking this medicine for a long time, you will need important blood work done. You will also need to have your eyes checked as directed.  This medicine can make you more sensitive to the sun. Keep out of the sun. If you cannot avoid being in the sun, wear protective clothing and use sunscreen. Do not use sun lamps or tanning beds/booths.  Avoid antacids and kaolin containing products for 2 hours before and after taking a dose of this medicine.  NOTE:This sheet is a summary. It may not cover all possible information. If you have questions about this medicine, talk to your doctor, pharmacist, or health care provider. Copyright© 2017 [employer]        Rheumatoid Arthritis  You have rheumatoid arthritis (RA). This is a chronic disease that mainly affects the joints. Sometimes, it also affects other parts of the body. RA is an autoimmune disease. This means that the bodys immune system, which normally protects the body,  causes harm instead. With RA, the immune system attacks the joints. The reason for this is unknown.  In most cases, RA affects pairs of joints on both sides of the body. These can include joints in both elbows, wrists, hands, knees, feet, or ankles. The disease often starts slowly. Early symptoms include stiffness, muscle aches, weakness, and fatigue. Over time, the joints may begin to hurt. They may also become warm, swollen, or tender. Symptoms may feel worse in the morning after a nights rest and may get better with activity.  With RA, you may have periods of active disease (when symptoms worsen) followed by periods of remission (when symptoms improve or go away). There is no known cure for RA. But medical treatment can slow or stop the progress of the disease. It can also help relieve symptoms. For advanced disease, surgery, such as joint replacement, may be the best option.  Home care  · If you were prescribed a medicine, take it as directed.  · To help control swelling and pain, acetaminophen, ibuprofen, or another NSAID (non-steroidal anti-inflammatory drug) may be recommended. Note: If you have chronic liver or kidney disease or ever had a stomach ulcer or gastrointestinal bleeding, tell your healthcare provider before taking any of these medicines.  · Some persons find relief with heat (hot shower, hot bath, or heating pad). Others prefer cold (ice in a plastic bag, wrapped in a towel). Try both. Then use the method you like best. Use heat or cold for about 20 minutes, a few times a day.  · Exercise is a key part of treatment for RA. It helps reduce pain. It may also improve flexibility. Do your best to be active daily. Move your joints through their full range of motion each morning. Avoid staying in any one position for long periods of time. Take breaks throughout the day and move around. Also, ask your healthcare provider or physical therapist what exercises are best for you.  · If you are overweight,  lose weight. Extra weight puts stress on your joints.  · If you smoke, quit. Smoking raises the risk of other problems linked to RA.  · No herbal product or nutritional supplement has been proven to help RA. But treatments such as acupuncture and massage may help relieve pain.  · Talk to your healthcare provider or occupational therapist about easier ways to perform daily tasks. This may include the use of assistive devices. These are special tools that can help with things like dressing, bathing, cooking, driving, and moving or getting around.  Follow-up care  Follow up with your healthcare provider, or as advised.   When to seek medical advice  Call your healthcare provider right away if any of these occur:  · Increasing weakness, pale color of the skin, fainting  · Chest pain or shortness of breath  · Blood in vomit or stool (black or red color)  · Changes in vision  · Skin ulcers  · Fever of 100.4ºF (38ºC) or higher, or as directed by your healthcare provider  · New joint pain  · New rash  Resources  To learn more about RA, contact:  · Arth[employer], 664.403.4161, www.arthritis.org  · [employer] [place] [employer] (NIAMS), 656.216.2196, www.niams.nih.gov     Date Last Reviewed: 3/1/2017  © 2111-9856 [employer]. [address] [place] [address], [place] [address]. All rights reserved. This information is not intended as a substitute for professional medical care. Always follow your healthcare professional's instructions.        Living with Rheumatoid Arthritis    Rheumatoid arthritis (RA) is a chronic disease, but it doesn't have to keep you from being active. It is an autoimmune disease and your immune system attacks the lining of your joints. You can help control RA with exercise and a healthy lifestyle. Be sure to see your healthcare provider for scheduled checkups and lab work. At some point, you may be referred to a rheumatologist (a healthcare provider who  specializes in arthritis and related diseases).  Make exercise part of your life  Gentle exercise can help lessen your pain. Keep the following in mind:  · Choose exercises that improve joint motion and make your muscles stronger. Your healthcare provider or a physical therapist may suggest a few.  · Most people should exercise for at least 30 minutes a day on most days of the week. This can be broken up into shorter periods throughout the day.  · Try walking, riding a bike, or doing exercises in a warm pool. Look for programs in your community for people with arthritis.   · Dont push yourself too hard at first. Slowly build up over time.  · Make sure you warm up for 5 to 10 minutes each time you exercise. Stretching and flexibility exercises are often helpful.   · If pain and stiffness increase, don't exercise as hard or as long.  Watch your weight  If you weigh more than you should, your weight-bearing joints are under extra pressure. This makes your symptoms worse. To reduce pain and stiffness, try shedding a few of those extra pounds. The tips below may help:  · Start a weight-loss program with the help of your healthcare provider.  · Ask your friends and family for support.  · Join a weight-loss group.  Learn ways to cope  Most people with long-term conditions find it a challenge to deal with the emotions that often go along with their conditions. With rheumatoid arthritis, there is also pain.   · Work with your healthcare provider on ways to lessen pain. Medicines, use of heat and cold, and other methods are available.  · Learn to relax. Although it may not be easy, it does help lessen stress, anxiety, and pain. Simple deep-breathing exercises, meditation, and yoga are examples of relaxation techniques.  · Depression is common with long-term conditions. If you feel depressed, make sure you talk with your healthcare provider. Again, treatments, like medicine and counseling, are available.  Try to make your day  easier  There are things you can do every day to protect your joints:  · Learn to balance rest with activity. Even on days when you have few symptoms, rest is still important.  · Ask friends and family members for help. Help with simple things can make a big difference for you. For example, you might ask someone to change a light bulb, or take out your weekly garbage.  · Use assistive devices, which are special tools that reduce strain and protect joints. For example:  ¨ Long-handled reachers or grabbers for reaching high and low  ¨ Jar-openers, two-handled cups, and button --all of these devices help to protect your fingers, hands, and wrists  ¨ Large  for pencils, pens, kitchen and garden tools  The Arthritis Foundation has many additional suggestions about protecting your joints. Go to their website at http://www.arthritis.org.  Use mobility and other aids  People with arthritis and other problems affecting the joints often use mobility aids, to help with walking. For example, they may use canes or walkers. They may also use splints or braces to support joints. Talk with your healthcare provider or therapist about these aids. For instance, you might benefit from:  · Use a cane to ease knee or hip pain and help prevent falls  · Splints for your wrists or other joints  · A brace to support a weak knee joint  Date Last Reviewed: 2/14/2016  © 0653-9915 [employer]. [address] [place] [address]. All rights reserved. This information is not intended as a substitute for professional medical care. Always follow your healthcare professional's instructions.        What is Rheumatoid Arthritis?  Rheumatoid arthritis (RA) is a disease that affects the synovium, the lining of the joints. This causes pain, swelling, and stiffness. Left untreated, rheumatoid arthritis may damage joints so badly that they no longer function. This disease appears most often in young-adult to middle-age  women.      Rheumatoid arthritis affects the lining (synovium) of the joints, sometimes causing swelling.   What causes RA?  RA is an autoimmune disorder. This means the immune system, which normally protects the body, actually causes harm. In RA, the immune system attacks the joint lining. The reason for this is unknown. Researchers believe it is a combination of genes (what is inherited from parents) and environment (for example, having infections from viruses or bacteria). Also, people who smoke or are overweight have an increased risk of developing RA.  What are the symptoms of RA?  Rheumatoid arthritis can affect most joints. The hands, wrists, elbows, knees, and balls of the feet are common sites. This disease often affects the same joint on both sides of the body. Symptoms may include:  · Tender, swollen inflamed joints. They may also look red and feel warm.  · Stiff joints. Long periods of rest or using a joint too long or too hard can make stiffness worse.  · Joints that have lost normal shape and motion.  · Feeling tired.  How is RA diagnosed?  To diagnose rheumatoid arthritis, your healthcare provider will ask you a lot of questions about your medical history and current symptoms. He or she will examine you, paying close attention to your joints. You will also have blood tests and X-rays. Your provider will likely recommend that you see a rheumatologist, an arthritis specialist.  Date Last Reviewed: 2/14/2016 © 2000-2017 [employer]. [address] [place] [address], [place] [address]. All rights reserved. This information is not intended as a substitute for professional medical care. Always follow your healthcare professional's instructions.

## 2020-01-07 NOTE — PROGRESS NOTES
RHEUMATOLOGY OUTPATIENT CLINIC NOTE    1/7/2020    Attending Rheumatologist: Bola Jolley  Primary Care Provider: Issac Edgar Jr, MD   Physician Requesting Consultation: No referring provider defined for this encounter.  Chief Complaint/Reason For Consultation:  Joint pain.    Subjective:       JOSE ANTONIO Armenta is a 88 y.o. White male with positive rheumatoid factor comes for follow-up.    Today  Last seen on October.  Pulmonary features of degenerative disc disease, gabapentin recommended p.r.n.  Did not have active inflammatory arthritis.  Since last visit refers having episodic joint pain and swelling.  Episodes last a few days, particular precipitating events.  Associated with prolonged stiffness.  Currently denies no complaints.  Denies new joint pain or swelling.  Does not have any stiffness.    Review of Systems   Constitutional: Negative for chills, fever and malaise/fatigue.   Eyes: Negative for pain and redness.   Respiratory: Negative for cough, hemoptysis and shortness of breath.    Cardiovascular: Negative for chest pain and leg swelling.   Gastrointestinal: Negative for abdominal pain, blood in stool and melena.   Genitourinary: Negative for dysuria and hematuria.   Musculoskeletal: Negative for falls and joint pain (Episodic joint pain with inflammatory features.).   Skin: Negative for rash.   Neurological: Negative for tingling and focal weakness.   Psychiatric/Behavioral: Negative for memory loss. The patient does not have insomnia.      Chronic comorbid conditions affecting medical decision making today:  · Osteoporosis on Boniva per PMD  · TIA?  · Hx of lumbar  Laminectomy  · Prostate CA  · Hypertension  · CHF  · CKD 3, solitary kidney  · AFib  · Secondary hyperparathyroidism  · BPPV  · Peripheral neuropathy  · Degenerative disc disease, osteoarthritis    History reviewed. No pertinent past medical history.  History reviewed. No pertinent surgical history.  History reviewed. No  "pertinent family history.  Social History     Substance and Sexual Activity   Alcohol Use Never    Frequency: Never     Social History     Tobacco Use   Smoking Status Never Smoker     Social History     Substance and Sexual Activity   Drug Use Not on file       Current Outpatient Medications:     ELIQUIS 5 mg Tab, Take 5 mg by mouth 2 (two) times daily., Disp: , Rfl: 11    gabapentin (NEURONTIN) 100 MG capsule, , Disp: , Rfl:     latanoprost 0.005 % ophthalmic solution, , Disp: , Rfl:     tetrahydrozoline HCl/zinc sulf (EYE DROPS IRRITATION RELIEF OPHT), Apply to eye., Disp: , Rfl:     hydroxychloroquine (PLAQUENIL) 200 mg tablet, Take 1 tablet (200 mg total) by mouth every evening., Disp: 60 tablet, Rfl: 1    ipratropium (ATROVENT) 42 mcg (0.06 %) nasal spray, 2 sprays by Nasal route 2 (two) times daily as needed for Rhinitis., Disp: , Rfl:     predniSONE (DELTASONE) 5 MG tablet, Take 1 tablet (5 mg total) by mouth daily as needed., Disp: 60 tablet, Rfl: 1     Objective:         Vitals:    01/07/20 0817   BP: 120/60   Pulse: 60     Physical Exam   Constitutional: No distress.   Estimated body mass index is 21.29 kg/m² as calculated from the following:    Height as of this encounter: 5' 9" (1.753 m).    Weight as of this encounter: 65.4 kg (144 lb 2.9 oz).    Wt Readings from Last 1 Encounters:  10/08/19 1425 : 65.4 kg (144 lb 2.9 oz)     HENT:   Head: Normocephalic and atraumatic.   Eyes: Conjunctivae are normal. Pupils are equal, round, and reactive to light.   Neck: Normal range of motion.   Cardiovascular: Normal rate and intact distal pulses.    Pulmonary/Chest: Effort normal. No respiratory distress.   Abdominal: Soft. He exhibits no distension.   Neurological: He is alert. Gait normal.   Skin: No rash noted. No erythema.     Musculoskeletal: Normal range of motion. He exhibits deformity (Slight Erinn's.  Thumb CMC squaring.  Slight Boutonnière appearance 5th PIP right more than left.). He exhibits " no edema or tenderness.   : strong  No active synovitis or significant squeeze tenderness    AROM: intact  PROM: intact    Devices used by patient: none       Reviewed old and all outside pertinent medical records available.    All lab results personally reviewed and interpreted by me.  Lab Results   Component Value Date    WBC 4.54 (L) 07/18/2006    HGB 15.5 07/18/2006    HCT 47.3 07/18/2006    MCV 94.6 07/18/2006    MCH 31.0 07/18/2006    MCHC 32.8 07/18/2006    RDW 14.4 07/18/2006     07/18/2006    MPV 11.5 07/18/2006       Lab Results   Component Value Date     08/30/2006    K 4.8 08/30/2006     08/30/2006    CO2 26 08/30/2006    GLU 91 08/30/2006    BUN 20 08/30/2006    CALCIUM 9.5 08/30/2006    PROT 7.0 07/18/2006    ALBUMIN 4.6 07/18/2006    BILITOT 0.9 07/18/2006    AST 37 07/18/2006    ALKPHOS 83 07/18/2006    ALT 39 07/18/2006       No results found for: COLORU, APPEARANCEUA, SPECGRAV, PHUR, PROTEINUA, GLUCOSEU, KETONESU, BLOODU, LEUKOCYTESUR, NITRITE, UROBILINOGEN    No results found for: CRP    Lab Results   Component Value Date    SEDRATE 1 12/07/2005       Lab Results   Component Value Date    SEDRATE 1 12/07/2005       No components found for: 25OHVITDTOT, 65NHCHJZ6, 26DMGXJX5, METHODNOTE    No results found for: URICACID    No components found for: TSPOTTB    (provided by patient 8/2019)  CBC, CMP: grossly unremarkable    Rheum Labs:   HELEN negative    (ref <14)   CCP negative   .7 -> 68 (12/2019)    Infectious Labs:   n/a     Imaging:  All imaging reviewed and independently  interpreted by me.    X-ray C-spine October 2019  Generalized osteopenia.  There is visualization of C7-T1 1 disc space on the lateral view.  Minimal multilevel marginal spurring.  Mild uniform loss of disc height at the C5-6 and C6-7 levels.  Minimal anterior subluxation C5 on C6.  Prevertebral soft tissues within normal limits.  Pre dens space normal.  C1-2 articulation demonstrates  asymmetric increased degenerative changes on the left.     ASSESSMENT / PLAN:     Mars Armenta is a 88 y.o. White male with:    1. Seropositive rheumatoid arthritis  - episodic inflamed pattern of joint pain, palindromic features  - persistent elevation of acute phase reactants (provided by patient), and high titer rheumatoid factor  - recommend starting low-dose Plaquenil (best DMARD accounting for his several comorbidities) and using low-dose prednisone p.r.n.  - clinical significance side effects of therapy discussed in detail  - follow with eye clinic to assess for Plaquenil toxicity  - may continue with low-dose gabapentin p.r.n. For DDD    2. Other specified counseling  - over 10 minutes spent regarding below topics:  - Immunization counseling done.  - Weight loss counseling done.  - Nutrition and exercise counseling.  - Limitation of alcohol consumption.  - Regular exercise:  Aerobic and resistance.  - Medication counseling provided.    Follow up in about 3 months (around 4/7/2020).    Method of contact with patient concerns: Debra malhotra Rheumatology    Disclaimer:  This note is prepared using voice recognition software and as such is likely to have errors and has not been proof read. Please contact me for questions.     Time spent: 25 minutes in face to face discussion concerning diagnosis, prognosis, review of lab and test results, benefits of treatment as well as management of disease, counseling of patient and coordination of care between various health care providers.  Greater than half the time spent was used for coordination of care and counseling of patient.    Bola Jolley M.D.  Rheumatology Department   [facility]

## 2020-03-25 ENCOUNTER — TELEPHONE (OUTPATIENT)
Dept: RHEUMATOLOGY | Facility: CLINIC | Age: 85
End: 2020-03-25

## 2020-03-25 NOTE — TELEPHONE ENCOUNTER
----- Message from Mary Luz sent at 3/25/2020  2:21 PM CDT -----  Contact: pt  Pt missed call and can be reached at [phone]      Thanks,  Mary Luz

## 2020-04-02 RX ORDER — HYDROXYCHLOROQUINE SULFATE 200 MG/1
200 TABLET, FILM COATED ORAL DAILY
COMMUNITY
End: 2020-04-02 | Stop reason: SDUPTHER

## 2020-04-02 RX ORDER — HYDROXYCHLOROQUINE SULFATE 200 MG/1
200 TABLET, FILM COATED ORAL DAILY
Qty: 30 TABLET | Refills: 2 | Status: SHIPPED | OUTPATIENT
Start: 2020-04-02 | End: 2020-05-02

## 2020-04-30 ENCOUNTER — TELEPHONE (OUTPATIENT)
Dept: RHEUMATOLOGY | Facility: CLINIC | Age: 85
End: 2020-04-30

## 2020-04-30 ENCOUNTER — PATIENT MESSAGE (OUTPATIENT)
Dept: RHEUMATOLOGY | Facility: CLINIC | Age: 85
End: 2020-04-30

## 2020-04-30 NOTE — TELEPHONE ENCOUNTER
Left message for patient to call . 5-12 appointment needs to be changed , Dr. Jolley is not seeing patients in clinic at this time. Possible to do Virtual Video or phone call visit. Message also set through portal

## 2020-05-01 ENCOUNTER — PATIENT MESSAGE (OUTPATIENT)
Dept: RHEUMATOLOGY | Facility: CLINIC | Age: 85
End: 2020-05-01

## 2020-05-11 ENCOUNTER — TELEPHONE (OUTPATIENT)
Dept: RHEUMATOLOGY | Facility: CLINIC | Age: 85
End: 2020-05-11

## 2020-05-12 ENCOUNTER — TELEPHONE (OUTPATIENT)
Dept: RHEUMATOLOGY | Facility: CLINIC | Age: 85
End: 2020-05-12

## 2020-05-12 ENCOUNTER — OFFICE VISIT (OUTPATIENT)
Dept: RHEUMATOLOGY | Facility: CLINIC | Age: 85
End: 2020-05-12
Payer: MEDICARE

## 2020-05-12 DIAGNOSIS — M05.9 SEROPOSITIVE RHEUMATOID ARTHRITIS: Primary | ICD-10-CM

## 2020-05-12 DIAGNOSIS — M15.9 PRIMARY OSTEOARTHRITIS INVOLVING MULTIPLE JOINTS: ICD-10-CM

## 2020-05-12 DIAGNOSIS — Z71.89 COUNSELING ON HEALTH PROMOTION AND DISEASE PREVENTION: ICD-10-CM

## 2020-05-12 PROCEDURE — 99999 PR PBB SHADOW E&M-EST. PATIENT-LVL I: ICD-10-PCS | Mod: PBBFAC,,, | Performed by: INTERNAL MEDICINE

## 2020-05-12 PROCEDURE — 99443 PR PHYSICIAN TELEPHONE EVALUATION 21-30 MIN: ICD-10-PCS | Mod: 95,,, | Performed by: INTERNAL MEDICINE

## 2020-05-12 PROCEDURE — 99999 PR PBB SHADOW E&M-EST. PATIENT-LVL I: CPT | Mod: PBBFAC,,, | Performed by: INTERNAL MEDICINE

## 2020-05-12 PROCEDURE — 99443 PR PHYSICIAN TELEPHONE EVALUATION 21-30 MIN: CPT | Mod: 95,,, | Performed by: INTERNAL MEDICINE

## 2020-05-12 NOTE — PROGRESS NOTES
RHEUMATOLOGY OUTPATIENT CLINIC NOTE    The patient location is: [place]  The chief complaint leading to consultation is:  Chronic pain  Visit type: audio only, patient unable to initiate video telemedicine visit  Total time spent with patient: 23mins  Each patient to whom he or she provides medical services by telemedicine is:  (1) informed of the relationship between the physician and patient and the respective role of any other health care provider with respect to management of the patient; and (2) notified that he or she may decline to receive medical services by telemedicine and may withdraw from such care at any time.    5/12/2020    Attending Rheumatologist: Bola Jolley  Primary Care Provider: Issac Edgar Jr, MD   Physician Requesting Consultation: No referring provider defined for this encounter.  Chief Complaint/Reason For Consultation:  Joint pain.    Subjective:       JOSE ANTONIO Armenta is a 88 y.o. White male with seropositive rheumatoid arthritis for follow-up.    Today  Last seen on January.  Recommended systemic corticosteroids and 200 mg of Plaquenil.  Patient developed easy bruising in setting of using Eliquis as well, was instructed to stop corticosteroids by Cardiology.  Patient to Plaquenil, however unable to specify for how long to therapy refers different less than 3 months when would expect onset of action.  Unable to recall response to systemic corticosteroids.  Main complaints unchanged since prior visit.  Refers episodic fatigue and generalized arthralgias with mixed pattern.  Refers prolonged stiffness and decreased hand dexterity.  Denies fever, current joint swelling,  or GI complaints.    Addendum 5/15:  Communicated with patient.  Satisfactory response to prednisone 10 mg daily provided 5/12.  Tolerating well without side effects.  Refers significant improvement to arthralgias and stiffness.  No response in terms of axial or ankle pain.  Instructed to resume Plaquenil and  taper prednisone to discontinue.  Will provide with written instructions through patient portal as below.    Addendum 6/25:  Patient provided labs from 6/15 reviewed.  Hyperglycemia (130, A1c 6.3 ) and platelets of 90 noted.  Recommend repeating labs in 2 weeks and following with primary care physician for glucose monitoring.  Information provided regarding thrombocytopenia.      Rheumatology medications 5/15/2020:    Prednisone (for rheumatoid arthritis)  - Taper to discontinue:  - continue 10 mg daily for 2 weeks then,  - continue 7.5mg daily for 2 weeks, then,  - continue 5mg daily for 1 weeks, then,  - continue 2.5mg daily for 1 weeks, then,  - continue 2.5mg every other day for 1 weeks then stop.  - may resume higher dose if refractory to taper, alternatively may use 10 mg daily for 10 days then stop.    Hydroxychloroquine (Plaquenil) (for rheumatoid arthritis)  - Take 1 tablet every 12 hr on Mondays through Saturdays.  No medication on Sundays.    Review of Systems   Constitutional: Negative for chills, fever and malaise/fatigue.   Eyes: Negative for pain and redness.   Respiratory: Negative for cough, hemoptysis and shortness of breath.    Cardiovascular: Negative for chest pain and leg swelling.   Gastrointestinal: Negative for abdominal pain, blood in stool and melena.   Genitourinary: Negative for dysuria and hematuria.   Musculoskeletal: Positive for back pain (Mechanical features.  No alarm signs or symptoms.), joint pain (mixed pattern, prominent DJD features) and neck pain (Mechanical features.  No alarm signs or symptoms). Negative for falls.   Skin: Negative for rash.   Neurological: Positive for tingling (Chronic, positional.  Intermittent.) and sensory change (Frequent imbalance.  History of BPPV). Negative for focal weakness.   Psychiatric/Behavioral: Negative for memory loss. The patient does not have insomnia.      Chronic comorbid conditions affecting medical decision making  "today:  · Osteoporosis on Boniva per PMD  · TIA?  · Hx of lumbar  Laminectomy  · Prostate CA  · Hypertension  · CHF  · CKD 3, solitary kidney  · AFib  · Secondary hyperparathyroidism  · BPPV  · Peripheral neuropathy  · Degenerative disc disease, osteoarthritis    History reviewed. No pertinent past medical history.  History reviewed. No pertinent surgical history.  History reviewed. No pertinent family history.  Social History     Substance and Sexual Activity   Alcohol Use Never    Frequency: Never     Social History     Tobacco Use   Smoking Status Never Smoker     Social History     Substance and Sexual Activity   Drug Use Not on file       Current Outpatient Medications:     ELIQUIS 5 mg Tab, Take 5 mg by mouth 2 (two) times daily., Disp: , Rfl: 11    gabapentin (NEURONTIN) 100 MG capsule, , Disp: , Rfl:     ipratropium (ATROVENT) 42 mcg (0.06 %) nasal spray, 2 sprays by Nasal route 2 (two) times daily as needed for Rhinitis., Disp: , Rfl:     latanoprost 0.005 % ophthalmic solution, , Disp: , Rfl:     tetrahydrozoline HCl/zinc sulf (EYE DROPS IRRITATION RELIEF OPHT), Apply to eye., Disp: , Rfl:      Objective:         There were no vitals filed for this visit.  Physical Exam   Constitutional: He is oriented to person, place, and time.   Estimated body mass index is 22.14 kg/m² as calculated from the following:    Height as of 1/7/20: 5' 9" (1.753 m).    Weight as of 1/7/20: 68 kg (149 lb 14.6 oz).    Wt Readings from Last 1 Encounters:  01/07/20 0817 : 68 kg (149 lb 14.6 oz)     Neurological: He is alert and oriented to person, place, and time.   Psychiatric: Mood and affect normal.   Musculoskeletal:   : strong  No synovitis or significant squeeze tenderness    AROM: intact  PROM: intact    Devices used by patient: none       Reviewed old and all outside pertinent medical records available.    All lab results personally reviewed and interpreted by me.  Lab Results   Component Value Date    WBC 4.54 " (L) 07/18/2006    HGB 15.5 07/18/2006    HCT 47.3 07/18/2006    MCV 94.6 07/18/2006    MCH 31.0 07/18/2006    MCHC 32.8 07/18/2006    RDW 14.4 07/18/2006     07/18/2006    MPV 11.5 07/18/2006       Lab Results   Component Value Date     08/30/2006    K 4.8 08/30/2006     08/30/2006    CO2 26 08/30/2006    GLU 91 08/30/2006    BUN 20 08/30/2006    CALCIUM 9.5 08/30/2006    PROT 7.0 07/18/2006    ALBUMIN 4.6 07/18/2006    BILITOT 0.9 07/18/2006    AST 37 07/18/2006    ALKPHOS 83 07/18/2006    ALT 39 07/18/2006       No results found for: COLORU, APPEARANCEUA, SPECGRAV, PHUR, PROTEINUA, GLUCOSEU, KETONESU, BLOODU, LEUKOCYTESUR, NITRITE, UROBILINOGEN    No results found for: CRP    Lab Results   Component Value Date    SEDRATE 1 12/07/2005       Lab Results   Component Value Date    SEDRATE 1 12/07/2005       No components found for: 25OHVITDTOT, 49BTHAWL1, 68JLAOXE8, METHODNOTE    No results found for: URICACID    No components found for: TSPOTTB    (provided by patient 8/2019)  CBC, CMP: grossly unremarkable  Lipid profile 3/2020, per patient tc: 142 Ldl: 65 hdl: 64    Rheum Labs:   HELEN negative    (ref <14)   CCP negative   .7 -> 68 (12/2019)    Infectious Labs:   n/a     Imaging:  All imaging reviewed and independently  interpreted by me.    X-ray C-spine October 2019  Generalized osteopenia.  There is visualization of C7-T1 1 disc space on the lateral view.  Minimal multilevel marginal spurring.  Mild uniform loss of disc height at the C5-6 and C6-7 levels.  Minimal anterior subluxation C5 on C6.  Prevertebral soft tissues within normal limits.  Pre dens space normal.  C1-2 articulation demonstrates asymmetric increased degenerative changes on the left.     ASSESSMENT / PLAN:     Mars Armenta is a 88 y.o. White male with:    1. Seropositive rheumatoid arthritis  - episodic inflamed pattern of joint pain, palindromic features  - persistent elevation of acute phase reactants  (provided by patient), and high titer rheumatoid factor  - recommend retrial of prednisone, 10 mg daily.  For follow-up with patient in around 3 days and assess response.  - if satisfactory response, will resume DMARD therapy with Plaquenil: 2600mg per week per body weight dosing  - may continue with low-dose gabapentin p.r.n. For DDD    2. Other specified counseling  - Nutrition and exercise counseling.  - Regular exercise:  Aerobic and resistance.  - Medication counseling provided.    No follow-ups on file.   RTC 4 months    Method of contact with patient concerns: Debra malhotra Rheumatology    Disclaimer:  This note is prepared using voice recognition software and as such is likely to have errors and has not been proof read. Please contact me for questions.     Bola Jolley M.D.  Rheumatology Department   [facility]

## 2020-05-12 NOTE — TELEPHONE ENCOUNTER
----- Message from Maria Esther Pillai sent at 5/12/2020  4:53 PM CDT -----  Contact: pt  .Type:  Patient Returning Call    Who Called: pt  Who Left Message for Patient: Camila   Does the patient know what this is regarding?:   Would the patient rather a call back or a response via [employer]?  Call back   Best Call Back Number: [phone] (home)   Additional Information:

## 2020-05-15 ENCOUNTER — PATIENT MESSAGE (OUTPATIENT)
Dept: RHEUMATOLOGY | Facility: CLINIC | Age: 85
End: 2020-05-15

## 2020-05-19 ENCOUNTER — PATIENT MESSAGE (OUTPATIENT)
Dept: RHEUMATOLOGY | Facility: CLINIC | Age: 85
End: 2020-05-19

## 2020-05-19 ENCOUNTER — TELEPHONE (OUTPATIENT)
Dept: RHEUMATOLOGY | Facility: CLINIC | Age: 85
End: 2020-05-19

## 2020-05-19 NOTE — TELEPHONE ENCOUNTER
Prednisone (for rheumatoid arthritis)   - Taper to discontinue:   - continue 10 mg daily for 2 weeks then,   - continue 7.5mg daily for 2 weeks, then,   - continue 5mg daily for 1 weeks, then,   - continue 2.5mg daily for 1 weeks, then,   - continue 2.5mg every other day for 1 weeks then stop.     If you experience recurrent prolonged stiffness and worsening joint pain, you may resume dose of prednisone in which you had improvement of these symptoms.  Alternatively, you make take 10 mg of prednisone daily for 10 days then stop.     Hydroxychloroquine (Plaquenil) (for rheumatoid arthritis)   - Take 1 tablet every 12 hr on Mondays through Saturdays.  No medication on Sundays.       Patient is very confused with These directions. I tried to explained but he is not understanding. He is understanding that you have given him a choice on how to take prednisone and Plaquenil. Please call him and explain.

## 2020-05-19 NOTE — TELEPHONE ENCOUNTER
----- Message from Eulalia Duque sent at 5/19/2020  8:12 AM CDT -----  Contact: Pt   Pt called and stated that he sent a message via patient portal regarding his side effects from a medication and needs an response as soon as possible by phone call or by pt portal. He can be reached at [phone].    Thanks,  TF

## 2020-05-20 ENCOUNTER — TELEPHONE (OUTPATIENT)
Dept: RHEUMATOLOGY | Facility: CLINIC | Age: 85
End: 2020-05-20

## 2020-05-21 ENCOUNTER — TELEPHONE (OUTPATIENT)
Dept: RHEUMATOLOGY | Facility: CLINIC | Age: 85
End: 2020-05-21

## 2020-05-27 NOTE — TELEPHONE ENCOUNTER
Akin morningCamila. FYI: As of today, the 27th, [facility] indicates they have not received the renewed prescription for the Prednisone requested on the 17th. Fortunately, I may be short of only a few tablets before I stop it to take the new medication he prescribed.  I can move up the new one a few days. Thanks! Mars Armenta.  ----- Message -----  From: Med Assistant Camila  Sent: 5/20/2020  7:05 AM CDT  To: Mars Armenta  Subject: RE: Prescription  I did receive a fax. I will forward it to Dr. Jolley. Thanks.    ----- Message -----     From: Mars Armenta     Sent: 5/19/2020  6:07 PM CDT       To: Bola Jolley MD  Subject: Prescription    Good afternoonCamila! Please, check. I understand that [facility] is trying to get from Dr. Jolley a new prescription for the Prednisone 5 mg Tablet I am taking. They sent me an email. Thanks! Mars Armenta.

## 2020-05-28 RX ORDER — PREDNISONE 5 MG/1
TABLET ORAL
Qty: 61 TABLET | Refills: 0 | Status: SHIPPED | OUTPATIENT
Start: 2020-05-28 | End: 2020-05-28

## 2020-05-28 RX ORDER — PREDNISONE 5 MG/1
TABLET ORAL
Qty: 61 TABLET | Refills: 0 | Status: SHIPPED | OUTPATIENT
Start: 2020-05-28 | End: 2020-07-16

## 2020-05-29 ENCOUNTER — PATIENT MESSAGE (OUTPATIENT)
Dept: RHEUMATOLOGY | Facility: CLINIC | Age: 85
End: 2020-05-29

## 2020-05-29 NOTE — TELEPHONE ENCOUNTER
RX states to take once daily, notes in telephone encounter on 5.19.20 state to take 1 every 12 hours Monday- Saturday and none on Sunday. Please Advise.

## 2020-06-01 ENCOUNTER — TELEPHONE (OUTPATIENT)
Dept: RHEUMATOLOGY | Facility: CLINIC | Age: 85
End: 2020-06-01

## 2020-06-01 NOTE — TELEPHONE ENCOUNTER
----- Message from Yoselin Cat sent at 6/1/2020 12:35 PM CDT -----  Contact: self [phone]  Type:  RX Refill Request    Who Called: Mars Armenta  Refill or New Rx:  Refill   RX Name and Strength:   Plaquenil  How is the patient currently taking it? (ex. 1XDay):  Is this a 30 day or 90 day RX:  Preferred Pharmacy with phone number:  [facility] - [address]  [address]  [facility] [address]  Phone: [phone] Fax: [phone]  Local or Mail Order:mail order  Ordering Provider:Dr Jolley   Would the patient rather a call back or a response via [facility]? Call back   Best Call Back Number:[phone]  Additional Information: pt need verification on dosage per day

## 2020-06-01 NOTE — TELEPHONE ENCOUNTER
Patient requesting clarification on plaquenil dosage.     RX states to take once daily, notes in telephone encounter on 5.19.20 state to take 1 every 12 hours Monday- Saturday and none on Sunday. Please Advise.

## 2020-06-02 ENCOUNTER — TELEPHONE (OUTPATIENT)
Dept: RHEUMATOLOGY | Facility: CLINIC | Age: 85
End: 2020-06-02

## 2020-06-02 NOTE — TELEPHONE ENCOUNTER
Patient just wants clarity on his Plaquenil it sates in the notes Q12h but on his prescription bottle states 1 tab po QD please advise.

## 2020-06-02 NOTE — TELEPHONE ENCOUNTER
Pt has called several time his concern is his Plaquenil states every Q12 but on prescription bottles it states 1 tab po QD he only wants the correct direction on how to take meds.  He has left several messages and I am routing this message to you again. Please advise

## 2020-06-02 NOTE — TELEPHONE ENCOUNTER
Phone patient and instructed pt to take Plaquenil one tablet by mouth twice a day EXCEPT Sunday only. Pt verbalized understanding x3.

## 2020-06-16 RX ORDER — HYDROXYCHLOROQUINE SULFATE 200 MG/1
200 TABLET, FILM COATED ORAL 2 TIMES DAILY
COMMUNITY
End: 2020-06-16 | Stop reason: SDUPTHER

## 2020-06-19 RX ORDER — HYDROXYCHLOROQUINE SULFATE 200 MG/1
200 TABLET, FILM COATED ORAL 2 TIMES DAILY
OUTPATIENT
Start: 2020-06-19

## 2020-06-19 RX ORDER — HYDROXYCHLOROQUINE SULFATE 200 MG/1
200 TABLET, FILM COATED ORAL 2 TIMES DAILY
Qty: 180 TABLET | Refills: 1 | Status: SHIPPED | OUTPATIENT
Start: 2020-06-19 | End: 2020-08-24 | Stop reason: SDUPTHER

## 2020-06-25 ENCOUNTER — TELEPHONE (OUTPATIENT)
Dept: RHEUMATOLOGY | Facility: CLINIC | Age: 85
End: 2020-06-25

## 2020-06-25 NOTE — TELEPHONE ENCOUNTER
----- Message from Halina Marr sent at 6/25/2020 10:25 AM CDT -----  Regarding: Self/   [phone]  Type: Patient Call Back    Who called:  Patient    What is the request in detail:  Patient returned call and would like a call back.  Thank you    Would the patient rather a call back or a response via [facility]?   Call back    Best call back number:  [phone]

## 2020-06-25 NOTE — TELEPHONE ENCOUNTER
Left message for patient to call regarding lab orders. I need to know where he would like them faxed .

## 2020-06-25 NOTE — TELEPHONE ENCOUNTER
----- Message from Bola Jolley MD sent at 6/25/2020  9:54 AM CDT -----  Regarding: Repeat CBC  Repeat CBC recommended for patient in 2 weeks.  Patient will probably request external labs to be performed, I sent for a print the should have been be electronically signed.  Thank you!

## 2020-07-08 LAB
BASOPHILS # BLD AUTO: 61 CELLS/UL (ref 0–200)
BASOPHILS NFR BLD AUTO: 1.6 %
EOSINOPHIL # BLD AUTO: 137 CELLS/UL (ref 15–500)
EOSINOPHIL NFR BLD AUTO: 3.6 %
ERYTHROCYTE [DISTWIDTH] IN BLOOD BY AUTOMATED COUNT: 13.6 % (ref 11–15)
HCT VFR BLD AUTO: 45.8 % (ref 38.5–50)
HGB BLD-MCNC: 15.3 G/DL (ref 13.2–17.1)
LYMPHOCYTES # BLD AUTO: 1505 CELLS/UL (ref 850–3900)
LYMPHOCYTES NFR BLD AUTO: 39.6 %
MCH RBC QN AUTO: 33 PG (ref 27–33)
MCHC RBC AUTO-ENTMCNC: 33.4 G/DL (ref 32–36)
MCV RBC AUTO: 98.9 FL (ref 80–100)
MONOCYTES # BLD AUTO: 661 CELLS/UL (ref 200–950)
MONOCYTES NFR BLD AUTO: 17.4 %
NEUTROPHILS # BLD AUTO: 1436 CELLS/UL (ref 1500–7800)
NEUTROPHILS NFR BLD AUTO: 37.8 %
PLATELET # BLD AUTO: 106 THOUSAND/UL (ref 140–400)
PMV BLD REES-ECKER: 12.2 FL (ref 7.5–12.5)
RBC # BLD AUTO: 4.63 MILLION/UL (ref 4.2–5.8)
WBC # BLD AUTO: 3.8 THOUSAND/UL (ref 3.8–10.8)

## 2020-08-25 RX ORDER — HYDROXYCHLOROQUINE SULFATE 200 MG/1
200 TABLET, FILM COATED ORAL 2 TIMES DAILY
Qty: 180 TABLET | Refills: 1 | Status: SHIPPED | OUTPATIENT
Start: 2020-08-25 | End: 2020-08-26 | Stop reason: SDUPTHER

## 2020-08-27 RX ORDER — HYDROXYCHLOROQUINE SULFATE 200 MG/1
200 TABLET, FILM COATED ORAL 2 TIMES DAILY
Qty: 180 TABLET | Refills: 1 | Status: SHIPPED | OUTPATIENT
Start: 2020-08-27 | End: 2020-11-25

## 2020-09-15 ENCOUNTER — OFFICE VISIT (OUTPATIENT)
Dept: RHEUMATOLOGY | Facility: CLINIC | Age: 85
End: 2020-09-15
Payer: MEDICARE

## 2020-09-15 ENCOUNTER — TELEPHONE (OUTPATIENT)
Dept: OTOLARYNGOLOGY | Facility: CLINIC | Age: 85
End: 2020-09-15

## 2020-09-15 ENCOUNTER — TELEPHONE (OUTPATIENT)
Dept: RHEUMATOLOGY | Facility: CLINIC | Age: 85
End: 2020-09-15

## 2020-09-15 VITALS
WEIGHT: 146.81 LBS | SYSTOLIC BLOOD PRESSURE: 155 MMHG | HEART RATE: 50 BPM | BODY MASS INDEX: 21.74 KG/M2 | HEIGHT: 69 IN | DIASTOLIC BLOOD PRESSURE: 77 MMHG

## 2020-09-15 DIAGNOSIS — Z71.89 COUNSELING ON HEALTH PROMOTION AND DISEASE PREVENTION: ICD-10-CM

## 2020-09-15 DIAGNOSIS — M05.9 SEROPOSITIVE RHEUMATOID ARTHRITIS: ICD-10-CM

## 2020-09-15 DIAGNOSIS — M15.9 PRIMARY OSTEOARTHRITIS INVOLVING MULTIPLE JOINTS: Primary | ICD-10-CM

## 2020-09-15 PROCEDURE — 1125F AMNT PAIN NOTED PAIN PRSNT: CPT | Mod: S$GLB,,, | Performed by: INTERNAL MEDICINE

## 2020-09-15 PROCEDURE — 1159F PR MEDICATION LIST DOCUMENTED IN MEDICAL RECORD: ICD-10-PCS | Mod: S$GLB,,, | Performed by: INTERNAL MEDICINE

## 2020-09-15 PROCEDURE — 99999 PR PBB SHADOW E&M-EST. PATIENT-LVL IV: CPT | Mod: PBBFAC,,, | Performed by: INTERNAL MEDICINE

## 2020-09-15 PROCEDURE — 99214 PR OFFICE/OUTPT VISIT, EST, LEVL IV, 30-39 MIN: ICD-10-PCS | Mod: S$GLB,,, | Performed by: INTERNAL MEDICINE

## 2020-09-15 PROCEDURE — 1101F PR PT FALLS ASSESS DOC 0-1 FALLS W/OUT INJ PAST YR: ICD-10-PCS | Mod: CPTII,S$GLB,, | Performed by: INTERNAL MEDICINE

## 2020-09-15 PROCEDURE — 1101F PT FALLS ASSESS-DOCD LE1/YR: CPT | Mod: CPTII,S$GLB,, | Performed by: INTERNAL MEDICINE

## 2020-09-15 PROCEDURE — 99999 PR PBB SHADOW E&M-EST. PATIENT-LVL IV: ICD-10-PCS | Mod: PBBFAC,,, | Performed by: INTERNAL MEDICINE

## 2020-09-15 PROCEDURE — 1159F MED LIST DOCD IN RCRD: CPT | Mod: S$GLB,,, | Performed by: INTERNAL MEDICINE

## 2020-09-15 PROCEDURE — 99214 OFFICE O/P EST MOD 30 MIN: CPT | Mod: S$GLB,,, | Performed by: INTERNAL MEDICINE

## 2020-09-15 PROCEDURE — 1125F PR PAIN SEVERITY QUANTIFIED, PAIN PRESENT: ICD-10-PCS | Mod: S$GLB,,, | Performed by: INTERNAL MEDICINE

## 2020-09-15 NOTE — TELEPHONE ENCOUNTER
----- Message from Stephani Cuba sent at 9/15/2020  3:12 PM CDT -----  Regarding: Return call  Contact: Patient @ [phone]  Patient is returning a phone call.  Who left a message for the patient: Shiela Miller  Does patient know what this is regarding:    Comments:

## 2020-09-15 NOTE — PROGRESS NOTES
RHEUMATOLOGY OUTPATIENT CLINIC NOTE    9/15/2020    Attending Rheumatologist: Bola Jolley  Primary Care Provider: Issac Edgar Jr, MD   Physician Requesting Consultation: No referring provider defined for this encounter.  Chief Complaint/Reason For Consultation:  Joint pain.    Subjective:       JOSE ANTONIO Armenta is a 89 y.o. White male with seropositive rheumatoid arthritis for follow-up.    Today  Last encounter via telemedicine in May.  Recommended to continue with Plaquenil.  No acute complaints.  Refers episodic neck pain.  Worse in the evening, aggravated by activity.  Relieved somewhat by rest.  No association with focal neurological symptoms.  Denies peripheral arthralgias or joint swelling.  Does not have new headaches, new onset blurry vision, jaw claudication, or fever.    Review of Systems   Constitutional: Negative for chills, fever and malaise/fatigue.   Eyes: Negative for pain and redness.   Respiratory: Negative for cough, hemoptysis and shortness of breath.    Cardiovascular: Negative for chest pain and leg swelling.   Gastrointestinal: Negative for abdominal pain, blood in stool and melena.   Genitourinary: Negative for dysuria and hematuria.   Musculoskeletal: Positive for neck pain (Chronic, intermittent.  Mechanical features.  No alarm signs or symptoms.). Negative for falls and joint pain.   Skin: Negative for rash.   Neurological: Negative for tingling and focal weakness.   Psychiatric/Behavioral: Negative for memory loss. The patient does not have insomnia.      Chronic comorbid conditions affecting medical decision making today:  · Osteoporosis on Boniva per PMD  · TIA?  · Hx of lumbar  Laminectomy  · Prostate CA  · Hypertension  · CHF  · CKD 3, solitary kidney  · AFib  · Secondary hyperparathyroidism  · BPPV  · Peripheral neuropathy  · Degenerative disc disease, osteoarthritis    History reviewed. No pertinent past medical history.  History reviewed. No pertinent surgical  "history.  History reviewed. No pertinent family history.  Social History     Substance and Sexual Activity   Alcohol Use Never    Frequency: Never     Social History     Tobacco Use   Smoking Status Never Smoker     Social History     Substance and Sexual Activity   Drug Use Not on file       Current Outpatient Medications:     ELIQUIS 5 mg Tab, Take 5 mg by mouth 2 (two) times daily., Disp: , Rfl: 11    hydrOXYchloroQUINE (PLAQUENIL) 200 mg tablet, Take 1 tablet (200 mg total) by mouth 2 (two) times daily. Do not take medication on sundays, Disp: 180 tablet, Rfl: 1    ipratropium (ATROVENT) 42 mcg (0.06 %) nasal spray, 2 sprays by Nasal route 2 (two) times daily as needed for Rhinitis., Disp: , Rfl:     latanoprost 0.005 % ophthalmic solution, , Disp: , Rfl:     timoloL (BETIMOL) 0.5 % ophthalmic solution, 1 drop 2 (two) times daily., Disp: , Rfl:     gabapentin (NEURONTIN) 100 MG capsule, , Disp: , Rfl:     tetrahydrozoline HCl/zinc sulf (EYE DROPS IRRITATION RELIEF OPHT), Apply to eye., Disp: , Rfl:      Objective:         Vitals:    09/15/20 1233   BP: (!) 155/77   Pulse: (!) 50     Physical Exam   Constitutional: He is oriented to person, place, and time. No distress.   Estimated body mass index is 22.14 kg/m² as calculated from the following:    Height as of 1/7/20: 5' 9" (1.753 m).    Weight as of 1/7/20: 68 kg (149 lb 14.6 oz).    Wt Readings from Last 1 Encounters:  01/07/20 0817 : 68 kg (149 lb 14.6 oz)     HENT:   Head: Normocephalic and atraumatic.   Eyes: Conjunctivae are normal. Pupils are equal, round, and reactive to light.   Neck: Normal range of motion.   Cardiovascular: Normal rate and intact distal pulses.    Pulmonary/Chest: Effort normal. No respiratory distress.   Abdominal: Soft. He exhibits no distension.   Neurological: He is alert and oriented to person, place, and time. Gait normal.   Skin: No rash noted. No erythema.     Psychiatric: Mood and affect normal.   Musculoskeletal: " Normal range of motion.      Comments: :  Able to make full fist without particular difficulty.  No synovitis appreciated    AROM: intact  PROM: intact    Devices used by patient:  Cane       Reviewed old and all outside pertinent medical records available.    All lab results personally reviewed and interpreted by me.  Lab Results   Component Value Date    WBC 3.8 07/07/2020    HGB 15.3 07/07/2020    HCT 45.8 07/07/2020    MCV 98.9 07/07/2020    MCH 33.0 07/07/2020    MCHC 33.4 07/07/2020    RDW 13.6 07/07/2020     (L) 07/07/2020    MPV 12.2 07/07/2020    NEUTROABS 1,436 (L) 07/07/2020       Lab Results   Component Value Date     08/30/2006    K 4.8 08/30/2006     08/30/2006    CO2 26 08/30/2006    GLU 91 08/30/2006    BUN 20 08/30/2006    CALCIUM 9.5 08/30/2006    PROT 7.0 07/18/2006    ALBUMIN 4.6 07/18/2006    BILITOT 0.9 07/18/2006    AST 37 07/18/2006    ALKPHOS 83 07/18/2006    ALT 39 07/18/2006       No results found for: COLORU, APPEARANCEUA, SPECGRAV, PHUR, PROTEINUA, GLUCOSEU, KETONESU, BLOODU, LEUKOCYTESUR, NITRITE, UROBILINOGEN    No results found for: CRP    Lab Results   Component Value Date    SEDRATE 1 12/07/2005       Lab Results   Component Value Date    SEDRATE 1 12/07/2005       No components found for: 25OHVITDTOT, 77XUJPEI4, 96CPYMIN6, METHODNOTE    No results found for: URICACID    No components found for: TSPOTTB    Rheum Labs:   HELEN negative    (ref <14)   CCP negative   .7 -> 68 (12/2019)    Infectious Labs:   n/a     Imaging:  All imaging reviewed and independently  interpreted by me.    X-ray C-spine October 2019  Generalized osteopenia.  There is visualization of C7-T1 1 disc space on the lateral view.  Minimal multilevel marginal spurring.  Mild uniform loss of disc height at the C5-6 and C6-7 levels.  Minimal anterior subluxation C5 on C6.  Prevertebral soft tissues within normal limits.  Pre dens space normal.  C1-2 articulation demonstrates  asymmetric increased degenerative changes on the left.     ASSESSMENT / PLAN:     Mars Armenta is a 89 y.o. White male with:    1. Seropositive rheumatoid arthritis  - CDAI:  Low disease activity.  Episodic hand arthritis knee mechanical pattern.    - Main concern remains consistent with degenerative disc disease.  - provided imaging without C1/C2 involvement.  - will continue Plaquenil per body weight unchanged.  Follow with eye clinic to monitor for retinal toxicity.  - History of single kidney and CKD, along with hyperglycemia limits therapeutic options.  - consider short course of Flexeril trial no more than 10 days with low-dose for neck pain  - physical therapy for gait training, fall prevention, and mechanical neck pain.    2. Other specified counseling  - Nutrition and exercise counseling.  - Regular exercise:  Aerobic and resistance.  - Medication counseling provided.    No follow-ups on file.   RTC 4 months    Method of contact with patient concerns: [facility]    Disclaimer:  This note is prepared using voice recognition software and as such is likely to have errors and has not been proof read. Please contact me for questions.     Bola Jolley M.D.  Rheumatology Department   [facility]

## 2020-09-15 NOTE — TELEPHONE ENCOUNTER
----- Message from Stephani Cuba sent at 9/15/2020  3:11 PM CDT -----  Regarding: Return call  Contact: Patient @ [phone]  Good Afternoon  Patient is returning a phone call.  Who left a message for the patient: Bola Jolley  Does patient know what this is regarding:    Comments:

## 2020-09-15 NOTE — TELEPHONE ENCOUNTER
Patient came to clinic in person and schedule appointment from referral. Patient voiced understanding of time date location.

## 2020-09-15 NOTE — PATIENT INSTRUCTIONS
Living with Osteoporosis: Preventing Fractures  If you have osteoporosis, you can do a lot to reduce its effect on your life. Knowing how to prevent fractures and spinal curvature can help you live more comfortably and safely with this disease.    Reducing your risk for fractures  The most common fracture sites in people with osteoporosis are the wrist, spine, and hip. These fractures are often caused by accidents and falls. All fractures are painful and may limit what you can do. But hip fractures are very serious. They need surgery, and it can take months to recover. To reduce your risk for fractures:  · Get regular exercise. Try walking, swimming, or weight training.  · Eat foods that are rich in calcium, or take calcium supplements.  · Make your home safe to help avoid accidents.  · Take extra precautions not to fall in risky areas, such as icy sidewalks.  Understanding spinal fractures  Your spine is made up of many bones called vertebrae. Osteoporosis can cause the vertebrae in your spine to collapse. As a result, your upper back may arch forward, creating a curvature. Spine fractures may also result from back strain and bad posture. You will also lose height. Your lower spine must then adjust to keep your body balanced. This can cause back pain. To prevent or lessen these spinal changes:  · Practice good posture.  · Use proper techniques if you need to lift heavy objects.  · Do back exercises to help your posture.  · Lie on your back when you have pain.  · Ask your healthcare provider about these and other ways to help your spine.  Date Last Reviewed: 10/17/2015  © 9134-9541 [email or web address]. [address] [place] [address], [place] [address]. All rights reserved. This information is not intended as a substitute for professional medical care. Always follow your healthcare professional's instructions.        Preventing Osteoporosis: Meeting Your Calcium Needs    Your body needs calcium to build and repair  bones. But it can't make calcium on its own. That's why it's important to eat calcium-rich foods. Some foods are naturally rich in calcium. Others have calcium added (fortified). It's best to get calcium from the foods you eat. But if you can't get enough, you may want to take calcium supplements. To meet your daily calcium needs, try the foods listed below.  Dairy Fish & beans Other sources      Source   Calcium (mg) per serving   Source   Calcium (mg) per serving   Source   Calcium (mg) per serving      Low-fat yogurt, plain   415 mg/8 oz.   Sardines, Atlantic, canned, with bones   351 mg/3 oz.   Oatmeal, instant, fortified   215 mg/1 cup   Nonfat milk   302 mg/1 cup   [place], sockeye, canned, with bones   239 mg/3 oz.   Tofu made with calcium sulfate   204 mg/3 oz.   Low-fat milk   297 mg/1 cup   Soybeans, fresh, boiled   131 mg/1/2 cup   Collards   179 mg/1/2 cup   Swiss cheese   272 mg/1 oz.   White beans, cooked   81 mg/1/2 cup   English muffin, whole wheat   175 mg/1 muffin   Cheddar cheese   205 mg/1 oz.   Navy beans, cooked   79 mg/1/2 cup   Kale   90 mg/1/2 cup   Ice cream strawberry   79 mg/1/2 cup           Orange, navel   56 mg/1 medium   Note: Calcium levels may vary depending on brand and size.  Daily calcium needs  14-18 years old: 1,300 mg  19-30 years old: 1,000 mg  31-50 years old: 1,000 mg  51-70 years old, women: 1,200 mg  51-70 years old, men: 1,000 mg  Pregnant or nursin-28 years old: 1,300 mg, 19-50 years old: 1,000 mg  Older than 70 (women and men): 1,200 mg   Date Last Reviewed: 10/17/2015  © 6420-6606 [employer]. [address] [place] [address], [place] [address]. All rights reserved. This information is not intended as a substitute for professional medical care. Always follow your healthcare professional's instructions.        Preventing Osteoporosis: Staying Active  Certain factors can speed up bone loss or decrease bone growth. For example, a lack of activity makes bones lose  their strength. Exercise plays a big part in maintaining bone mass, no matter what your age. The amount and type of activity you do also play a part in keeping your bones strong. Weight-bearing and resistance exercises, such as walking, aerobic dancing, and bicycling, are just a few of the activities that are good for your bones.     Resistance exercises, such as weight training, help maintain bones by strengthening the muscles around them. Swimming is also a good choice.     · Check with your healthcare provider before starting any new exercise program.  · Stop any exercise that causes pain.   Date Last Reviewed: 10/17/2015  © 8024-8562 [employer]. [address] [place] [address], [place] [address]. All rights reserved. This information is not intended as a substitute for professional medical care. Always follow your healthcare professional's instructions.        Cyclobenzaprine tablets  What is this medicine?  CYCLOBENZAPRINE (sye mohamud SUSANNAH katy preen) is a muscle relaxer. It is used to treat muscle pain, spasms, and stiffness.  How should I use this medicine?  Take this medicine by mouth with a glass of water. Follow the directions on the prescription label. If this medicine upsets your stomach, take it with food or milk. Take your medicine at regular intervals. Do not take it more often than directed.  Talk to your pediatrician regarding the use of this medicine in children. Special care may be needed.  What side effects may I notice from receiving this medicine?  Side effects that you should report to your doctor or health care professional as soon as possible:  · allergic reactions like skin rash, itching or hives, swelling of the face, lips, or tongue  · breathing problems  · chest pain  · fast, irregular heartbeat  · hallucinations  · seizures  · unusually weak or tired  Side effects that usually do not require medical attention (report to your doctor or health care professional if they continue or are  bothersome):  · headache  · nausea, vomiting  What may interact with this medicine?  Do not take this medicine with any of the following medications:  · certain medicines for fungal infections like fluconazole, itraconazole, ketoconazole, posaconazole, voriconazole  · cisapride  · dofetilide  · dronedarone  · halofantrine  · levomethadyl  · MAOIs like Carbex, Eldepryl, Marplan, Nardil, and Parnate  · narcotic medicines for cough  · pimozide  · thioridazine  · ziprasidone  This medicine may also interact with the following medications:  · alcohol  · antihistamines for allergy, cough and cold  · certain medicines for anxiety or sleep  · certain medicines for cancer  · certain medicines for depression like amitriptyline, fluoxetine, sertraline  · certain medicines for infection like alfuzosin, chloroquine, clarithromycin, levofloxacin, mefloquine, pentamidine, troleandomycin  · certain medicines for irregular heart beat  · certain medicines for seizures like phenobarbital, primidone  · contrast dyes  · general anesthetics like halothane, isoflurane, methoxyflurane, propofol  · local anesthetics like lidocaine, pramoxine, tetracaine  · medicines that relax muscles for surgery  · narcotic medicines for pain  · other medicines that prolong the QT interval (cause an abnormal heart rhythm)  · phenothiazines like chlorpromazine, mesoridazine, prochlorperazine  What if I miss a dose?  If you miss a dose, take it as soon as you can. If it is almost time for your next dose, take only that dose. Do not take double or extra doses.  Where should I keep my medicine?  Keep out of the reach of children.  Store at room temperature between 15 and 30 degrees C (59 and 86 degrees F). Keep container tightly closed. Throw away any unused medicine after the expiration date.  What should I tell my health care provider before I take this medicine?  They need to know if you have any of these conditions:  · heart disease, irregular heartbeat,  or previous heart attack  · liver disease  · thyroid problem  · an unusual or allergic reaction to cyclobenzaprine, tricyclic antidepressants, lactose, other medicines, foods, dyes, or preservatives  · pregnant or trying to get pregnant  · breast-feeding  What should I watch for while using this medicine?  Tell your doctor or health care professional if your symptoms do not start to get better or if they get worse.  You may get drowsy or dizzy. Do not drive, use machinery, or do anything that needs mental alertness until you know how this medicine affects you. Do not stand or sit up quickly, especially if you are an older patient. This reduces the risk of dizzy or fainting spells. Alcohol may interfere with the effect of this medicine. Avoid alcoholic drinks.  If you are taking another medicine that also causes drowsiness, you may have more side effects. Give your health care provider a list of all medicines you use. Your doctor will tell you how much medicine to take. Do not take more medicine than directed. Call emergency for help if you have problems breathing or unusual sleepiness.  Your mouth may get dry. Chewing sugarless gum or sucking hard candy, and drinking plenty of water may help. Contact your doctor if the problem does not go away or is severe.  NOTE:This sheet is a summary. It may not cover all possible information. If you have questions about this medicine, talk to your doctor, pharmacist, or health care provider. Copyright© 2017 [employer]

## 2020-09-16 NOTE — TELEPHONE ENCOUNTER
Advised pt PT order can be mailed to him, Referred by Dr. Jolley, stated that is fine, placed in mail, Verbalized understanding.

## 2020-09-19 ENCOUNTER — PATIENT MESSAGE (OUTPATIENT)
Dept: OTOLARYNGOLOGY | Facility: CLINIC | Age: 85
End: 2020-09-19

## 2020-10-03 ENCOUNTER — LAB VISIT (OUTPATIENT)
Dept: OTOLARYNGOLOGY | Facility: CLINIC | Age: 85
End: 2020-10-03
Payer: MEDICARE

## 2020-10-03 DIAGNOSIS — Z01.818 PRE-OP TESTING: ICD-10-CM

## 2020-10-03 PROCEDURE — U0003 INFECTIOUS AGENT DETECTION BY NUCLEIC ACID (DNA OR RNA); SEVERE ACUTE RESPIRATORY SYNDROME CORONAVIRUS 2 (SARS-COV-2) (CORONAVIRUS DISEASE [COVID-19]), AMPLIFIED PROBE TECHNIQUE, MAKING USE OF HIGH THROUGHPUT TECHNOLOGIES AS DESCRIBED BY CMS-2020-01-R: HCPCS

## 2020-10-04 LAB — SARS-COV-2 RNA RESP QL NAA+PROBE: NOT DETECTED

## 2020-10-06 ENCOUNTER — TELEPHONE (OUTPATIENT)
Dept: OTOLARYNGOLOGY | Facility: CLINIC | Age: 85
End: 2020-10-06

## 2020-10-06 ENCOUNTER — OFFICE VISIT (OUTPATIENT)
Dept: OTOLARYNGOLOGY | Facility: CLINIC | Age: 85
End: 2020-10-06
Payer: MEDICARE

## 2020-10-06 VITALS
WEIGHT: 143.94 LBS | SYSTOLIC BLOOD PRESSURE: 145 MMHG | BODY MASS INDEX: 21.26 KG/M2 | DIASTOLIC BLOOD PRESSURE: 78 MMHG | HEART RATE: 53 BPM | TEMPERATURE: 98 F

## 2020-10-06 DIAGNOSIS — R13.13 PHARYNGEAL DYSPHAGIA: ICD-10-CM

## 2020-10-06 DIAGNOSIS — R13.10 DYSPHAGIA, UNSPECIFIED TYPE: Primary | ICD-10-CM

## 2020-10-06 DIAGNOSIS — Z01.818 PRE-PROCEDURAL EXAMINATION: ICD-10-CM

## 2020-10-06 DIAGNOSIS — R63.30 FEEDING DIFFICULTIES: ICD-10-CM

## 2020-10-06 DIAGNOSIS — J31.0 RHINITIS, NONALLERGIC, CHRONIC: ICD-10-CM

## 2020-10-06 PROCEDURE — 1159F MED LIST DOCD IN RCRD: CPT | Mod: S$GLB,,, | Performed by: ORTHOPAEDIC SURGERY

## 2020-10-06 PROCEDURE — 1101F PR PT FALLS ASSESS DOC 0-1 FALLS W/OUT INJ PAST YR: ICD-10-PCS | Mod: CPTII,S$GLB,, | Performed by: ORTHOPAEDIC SURGERY

## 2020-10-06 PROCEDURE — 99204 PR OFFICE/OUTPT VISIT, NEW, LEVL IV, 45-59 MIN: ICD-10-PCS | Mod: 25,S$GLB,, | Performed by: ORTHOPAEDIC SURGERY

## 2020-10-06 PROCEDURE — 1101F PT FALLS ASSESS-DOCD LE1/YR: CPT | Mod: CPTII,S$GLB,, | Performed by: ORTHOPAEDIC SURGERY

## 2020-10-06 PROCEDURE — 1126F PR PAIN SEVERITY QUANTIFIED, NO PAIN PRESENT: ICD-10-PCS | Mod: S$GLB,,, | Performed by: ORTHOPAEDIC SURGERY

## 2020-10-06 PROCEDURE — 99999 PR PBB SHADOW E&M-EST. PATIENT-LVL III: CPT | Mod: PBBFAC,,, | Performed by: ORTHOPAEDIC SURGERY

## 2020-10-06 PROCEDURE — 31575 PR LARYNGOSCOPY, FLEXIBLE; DIAGNOSTIC: ICD-10-PCS | Mod: S$GLB,,, | Performed by: ORTHOPAEDIC SURGERY

## 2020-10-06 PROCEDURE — 99999 PR PBB SHADOW E&M-EST. PATIENT-LVL III: ICD-10-PCS | Mod: PBBFAC,,, | Performed by: ORTHOPAEDIC SURGERY

## 2020-10-06 PROCEDURE — 99204 OFFICE O/P NEW MOD 45 MIN: CPT | Mod: 25,S$GLB,, | Performed by: ORTHOPAEDIC SURGERY

## 2020-10-06 PROCEDURE — 31575 DIAGNOSTIC LARYNGOSCOPY: CPT | Mod: S$GLB,,, | Performed by: ORTHOPAEDIC SURGERY

## 2020-10-06 PROCEDURE — 1126F AMNT PAIN NOTED NONE PRSNT: CPT | Mod: S$GLB,,, | Performed by: ORTHOPAEDIC SURGERY

## 2020-10-06 PROCEDURE — 1159F PR MEDICATION LIST DOCUMENTED IN MEDICAL RECORD: ICD-10-PCS | Mod: S$GLB,,, | Performed by: ORTHOPAEDIC SURGERY

## 2020-10-06 NOTE — PROGRESS NOTES
Subjective:      Patient ID: Mars Armenta is a 89 y.o. male.    Chief Complaint: Dysphagia    Patient is an 89-year-old gentleman here to see me today for the 1st time for evaluation of several issues.  First, he has noted increasing dysphagia primarily to solids.  He says that at times when he is swallowing solids he feels as if he gets stuck, and does trigger him to cough as well as have a choking sensation.  He has no difficulty with liquids.  He has not had any weight loss as a result of his dysphagia.  He does not smoke.  He denies any otalgia, and also denies odynophagia.  Second, he does have issues with excess earwax, he has been using an over-the-counter regimen and cleaning at home for many years.  He has not had any recent ear drainage.  Third, he has noted a hoarseness and change to his voice.  He does have allergies, and is on and nasal steroid spray daily which overall helps to control his symptoms.        Review of Systems   Constitutional: Negative for fatigue, fever and unexpected weight change.   HENT: Positive for congestion, trouble swallowing and voice change. Negative for ear discharge, ear pain, facial swelling, hearing loss, nosebleeds, postnasal drip, rhinorrhea, sinus pressure, sneezing, sore throat and tinnitus.    Eyes: Negative for discharge, redness and itching.   Respiratory: Positive for choking. Negative for cough, shortness of breath and wheezing.    Cardiovascular: Negative for chest pain and palpitations.   Gastrointestinal: Negative for abdominal pain.        No reflux.   Musculoskeletal: Negative for neck pain.   Allergic/Immunologic: Positive for environmental allergies.   Neurological: Negative for dizziness, facial asymmetry, light-headedness and headaches.   Hematological: Negative for adenopathy. Does not bruise/bleed easily.   Psychiatric/Behavioral: Negative for agitation, behavioral problems, confusion and decreased concentration.       Objective:       Physical  Exam  Constitutional:       General: He is not in acute distress.     Appearance: He is well-developed.   HENT:      Head: Normocephalic and atraumatic.      Jaw: No trismus.      Right Ear: Hearing, tympanic membrane, ear canal and external ear normal.      Left Ear: Hearing, tympanic membrane, ear canal and external ear normal.      Nose: Nose normal. No nasal deformity, septal deviation, mucosal edema or rhinorrhea.      Mouth/Throat:      Dentition: Normal dentition.      Pharynx: Uvula midline. No oropharyngeal exudate or uvula swelling.   Eyes:      General: No scleral icterus.     Conjunctiva/sclera: Conjunctivae normal.      Right eye: Right conjunctiva is not injected. No chemosis.     Left eye: Left conjunctiva is not injected. No chemosis.     Pupils: Pupils are equal, round, and reactive to light.   Neck:      Thyroid: No thyroid mass or thyromegaly.      Trachea: Trachea and phonation normal. No tracheal tenderness or tracheal deviation.   Pulmonary:      Effort: Pulmonary effort is normal. No accessory muscle usage or respiratory distress.      Breath sounds: No stridor.   Lymphadenopathy:      Head:      Right side of head: No submental, submandibular, preauricular or posterior auricular adenopathy.      Left side of head: No submental, submandibular, preauricular or posterior auricular adenopathy.      Cervical: No cervical adenopathy.      Right cervical: No superficial or deep cervical adenopathy.     Left cervical: No superficial or deep cervical adenopathy.   Skin:     General: Skin is warm and dry.      Findings: No erythema or rash.   Neurological:      Mental Status: He is alert and oriented to person, place, and time.      Cranial Nerves: No cranial nerve deficit.   Psychiatric:         Behavior: Behavior normal.         Thought Content: Thought content normal.       Due to indication in patient's history, presentation or risk factors,  a fiber optic exam was performed.    SEPARATE PROCEDURE  NOTE:    ANESTHESIA:  Topical xylocaine with ignacio-synephrine  FINDINGS:  Normal exam, copious secretions pooling around glottis      PROCEDURE:  After verbal consent was obtained, the flexible scope was passed through the patient's nasal cavity without difficulty.  The nasopharynx (adenoid pad) and eustachian tube orifices were first visualized and were found to be normal, without masses or irregularity.  The posterior pharyngeal wall and base of tongue were then examined and no mass or irregular tissue was seen.  The scope was then advanced to the larynx, and the epiglottis, valleculae, and piriform sinuses were normal, without masses or mucosal irregularity.  The false vocal folds and true vocal folds were then examined and were found to have normal mobility (full abduction and adduction) and no masses or mucosal irregularity was seen.  The arytenoids and the interarytenoid area were normal.        Assessment:       1. Dysphagia, unspecified type    2. Pre-procedural examination    3. Rhinitis, nonallergic, chronic        Plan:     Dysphagia, unspecified type:  He is noting increasing dysphagia, particularly to solids as well as coughing and choking with swallowing solids.  I would recommend a modified barium swallow study with speech therapy for full evaluation, will call patient with recommendations following that procedure.  He does have copious pooling of secretions around his glottis, suggesting decreased sensation which certainly could increase his risk for aspiration.  -     SLP video swallow; Future    Pre-procedural examination  -     COVID-19 Routine Screening; Future; Expected date: 10/13/2020    Rhinitis, nonallergic, chronic:  Overall his nasal congestion is controlled with topical nasal steroid spray.  I would recommend continuing with that medication on a daily basis, he has not experienced any significant side effects.

## 2020-10-06 NOTE — LETTER
October 6, 2020      Mitchell Desai MD  [address]  [facility] [address]           [facility]  [address]  [place] [address]  Phone: [phone]  Fax: [phone]          Patient: Mars Armenta   MR Number: [id number]   YOB: 1931   Date of Visit: 10/6/2020       Dear Dr. Mitchell Desai:    Thank you for referring Mars Armenta to me for evaluation. Attached you will find relevant portions of my assessment and plan of care.    If you have questions, please do not hesitate to call me. I look forward to following Mars Armenta along with you.    Sincerely,    Shiela Miller MD    Enclosure  CC:  No Recipients    If you would like to receive this communication electronically, please contact [email or web address] or [phone] to request more information on [facility] access.    For providers and/or their staff who would like to refer a patient to [facility], please contact us through our one-stop-shop provider referral line, [facility] , at [phone].    If you feel you have received this communication in error or would no longer like to receive these types of communications, please e-mail [email or web address]

## 2020-10-06 NOTE — LETTER
October 6, 2020        Issac Edgar Jr., MD  [address] [facility]  [facility]  [place] [id number]             [facility]  [address] [facility]  [place] [address]  Phone: [phone]  Fax: [phone]   Patient: Mars Armenta   MR Number: [id number]   YOB: 1931   Date of Visit: 10/6/2020       Dear Dr. Edgar:    Thank you for referring Mars Armenta to me for evaluation. Attached you will find relevant portions of my assessment and plan of care.    If you have questions, please do not hesitate to call me. I look forward to following Mars Armenta along with you.    Sincerely,      Shiela Miller MD            CC  No Recipients    Enclosure

## 2020-10-06 NOTE — TELEPHONE ENCOUNTER
----- Message from Shiela Miller MD sent at 10/6/2020 10:12 AM CDT -----  Please send copy of my note to his PCP Issac Edgar

## 2020-10-09 ENCOUNTER — LAB VISIT (OUTPATIENT)
Dept: OTOLARYNGOLOGY | Facility: CLINIC | Age: 85
End: 2020-10-09
Payer: MEDICARE

## 2020-10-09 DIAGNOSIS — Z01.818 PRE-PROCEDURAL EXAMINATION: ICD-10-CM

## 2020-10-09 PROCEDURE — U0003 INFECTIOUS AGENT DETECTION BY NUCLEIC ACID (DNA OR RNA); SEVERE ACUTE RESPIRATORY SYNDROME CORONAVIRUS 2 (SARS-COV-2) (CORONAVIRUS DISEASE [COVID-19]), AMPLIFIED PROBE TECHNIQUE, MAKING USE OF HIGH THROUGHPUT TECHNOLOGIES AS DESCRIBED BY CMS-2020-01-R: HCPCS

## 2020-10-10 LAB — SARS-COV-2 RNA RESP QL NAA+PROBE: NOT DETECTED

## 2020-10-12 ENCOUNTER — TELEPHONE (OUTPATIENT)
Dept: OTOLARYNGOLOGY | Facility: CLINIC | Age: 85
End: 2020-10-12

## 2020-10-12 ENCOUNTER — HOSPITAL ENCOUNTER (OUTPATIENT)
Dept: RADIOLOGY | Facility: HOSPITAL | Age: 85
Discharge: HOME | End: 2020-10-12
Attending: ORTHOPAEDIC SURGERY
Payer: MEDICARE

## 2020-10-12 DIAGNOSIS — R13.10 DYSPHAGIA, UNSPECIFIED TYPE: ICD-10-CM

## 2020-10-12 DIAGNOSIS — R63.30 FEEDING DIFFICULTIES: ICD-10-CM

## 2020-10-12 DIAGNOSIS — R13.13 PHARYNGEAL DYSPHAGIA: ICD-10-CM

## 2020-10-12 DIAGNOSIS — R13.13 PHARYNGEAL DYSPHAGIA: Primary | ICD-10-CM

## 2020-10-12 PROCEDURE — 74230 X-RAY XM SWLNG FUNCJ C+: CPT | Mod: 26,,, | Performed by: RADIOLOGY

## 2020-10-12 PROCEDURE — 74230 FL MODIFIED BARIUM SWALLOW SPEECH STUDY: ICD-10-PCS | Mod: 26,,, | Performed by: RADIOLOGY

## 2020-10-12 PROCEDURE — A9698 NON-RAD CONTRAST MATERIALNOC: HCPCS | Performed by: ORTHOPAEDIC SURGERY

## 2020-10-12 PROCEDURE — 25500020 PHARM REV CODE 255: Performed by: ORTHOPAEDIC SURGERY

## 2020-10-12 PROCEDURE — 74230 X-RAY XM SWLNG FUNCJ C+: CPT | Mod: TC

## 2020-10-12 PROCEDURE — 92611 MOTION FLUOROSCOPY/SWALLOW: CPT | Performed by: SPEECH-LANGUAGE PATHOLOGIST

## 2020-10-12 RX ADMIN — Medication 40 G: at 01:10

## 2020-10-12 NOTE — TELEPHONE ENCOUNTER
----- Message from Subha Vitale sent at 10/12/2020  2:30 PM CDT -----  Good afternoon!    Kaley would like to see this pt for therapy. Can you please place a speech referral for him?    Thanks so much!

## 2020-10-12 NOTE — PROGRESS NOTES
"Outpatient MODIFIED BARIUM SWALLOW STUDY    Date: 10/12/2020     Name: Mars Armenta   MRN: [id number]    Therapy Diagnosis: mild pharyngeal dysphagia     Physician: Shiela Miller MD  Physician Orders: Fl Modified Barium Swallow Speech  Medical Diagnosis from Referral: Dysphagia, unspecified     Date of Evaluation:  10/12/2020    Procedure Min.   Fl Modified Barium Swallow Speech  60     Precautions:Standard    Subjective   History of Current Condition: Mars Armenta is a 89 y.o. male here today for Modified Barium Swallow Study (MBSS). Patient reports that over the past 5 years, he has noticed gradual dysphagia symptoms including the feeling of food "getting stuck" in his throat for nearly all meals. Additionally, patient reports coughing to clear feeling of food getting stuck which often doesn't clear the sensation. Patient reports sometimes he is able to "get food up" and spit it out. Patient reports no weight loss secondary to dysphagia symptoms. Patient reports that he has researched dysphagia and therefore has adopted a behavior of swallowing several times per bolus to clear the food that is "stuck in his throat."     Past Medical History: Mars Armenta  has no past medical history on file.  Mars Armenta  has no past surgical history on file.    Relevant office visits:   Dr. Shiela Miller, ENT 10/6/20:   He has noted increasing dysphagia primarily to solids.  He says that at times when he is swallowing solids he feels as if he gets stuck, and does trigger him to cough as well as have a choking sensation.  He has no difficulty with liquids.  He has not had any weight loss as a result of his dysphagia.  He does not smoke.  He denies any otalgia, and also denies odynophagia.     Medical Hx and Allergies:    Review of patient's allergies indicates:   Allergen Reactions    Baclofen Other (See Comments)     Pain & Dizziness    Furosemide Other (See Comments)     Fluid build up in lungs    Meloxicam Other (See " Comments)     Fluid build up in lungs    Naproxen Other (See Comments)     Pain & Dizziness    Tizanidine Other (See Comments)     Pain & Dizziness     Relevant Imaging:  N/A    Objective     Modified Barium Swallow Study  Purpose: to evaluate anatomy and physiology of the oropharyngeal swallow, to determine effectiveness of rehabilitation strategies, and to determine diet consistency and intervention recommendations.    Treatment   Treatment Time In: 1:30 PM  Treatment Time Out: 2:00 PM  Total Treatment Time: 30 minutes  Pt educated regarding results and recommendations of the evaluation. See the recommendations section below.    Education: Plan of Care, role of SLP in care and anatomy and physiology of swallow mechanism as it relates to patient's dysphagia symptoms were discussed with the patient. Patient expressed understanding. All questions were answered.     Assessment     Mars Armenta is a 89 y.o. male referred for Modified Barium Swallow Study with a medical diagnosis of dysphagia, unspecified.     Consistency  Presentation  Trials: Pen/Asp/Residue Strategy Used    Thin (0) Self-fed; cup sip; lateral view Oral phase: no significant findings     Pharyngeal phase: trace- mild valleculae and pyriform sinus residue which is not cleared with spontaneous successive swallows; of note, patient swallowed 3 times per bolus and cleared his throat after the swallow despite no penetration or aspiration      [place] thick (moderately thick 2) Self-fed; cup sip; lateral view Oral phase: no significant findings     Pharyngeal phase: mild valleculae/PS/level of the UES residue not cleared with spontaneous successive swallows       [place] (extremely thick 3) Self-fed; spoon; lateral view Oral phase: no significant findings     Pharyngeal phase: mild valleculae residue; of note, no spontaneous or cued swallow as patient was instructed to only swallow when he felt the need to     Esophageal phase: in AP view, bilateral  squeezing; esophagus does not clear following 30 second delay       Solid (regular 7) Self-fed; spoon; lateral view Oral phase: no significant findings     Pharyngeal phase: trace-mild valleculae and pyriform sinus residue somewhat cleared with liquid wash    Liquid wash: clears some of pharyngeal residue    Mixed consistency (thin 0 + soft and bite sized 6) Self-fed; spoon; lateral view  Oral phase: no significant findings     Pharyngeal phase: trace valleculae/pyriform sinus residue         OVERALL IMPRESSION:   The patient presents with mild pharyngeal dysphagia.    Modified Barium Swallow Study (MBSS) revealed oral phase characterized by lingual and labial strength and range of motion within functional limits for bolus preparation and propulsion as evidenced by mastication time WFL and no oral residue. During pharyngeal swallow, secondary to generalized weakness, decreased base of tongue retraction and hyolaryngeal excursion resulted in trace-mild residue at the valleculae and pyriform sinuses which was not cleared with spontaneous sequential swallows. No penetration or aspiration were viewed in today's study. Esophageal phase characterized by reduced clearance of bolus into stomach following 30 second delay. It is to be noted, patient presents with a curved cervical spinal column which reduces esophageal space and a cricopharyngeal prominence at the level of C4/C5; however, this appears to impact overall swallow function. Also of note, patient demonstrates several negative compensatory behaviors including repetitive swallows (3-4 per bolus) and throat clearing which negatively impact swallow function and create fatigue as the meal progresses. Negative compensatory behaviors also negatively impact esophageal phase as consistent throat clearing brings bolus back up. Consistent throat clearing likely also contributes to patient reported voice changes. It is recommended patient follow-up with GI secondary to  delayed emptying of esophagus and participate in speech therapy to reduce negative compensatory behaviors associated with swallowing.     Recommendations:     1. Continue thin liquids (IDDSI 0) and regular consistencies (IDDSI 7) diet.   2. Dysphagia therapy is recommended including food/liquid trials to eliminate negative compensatory behaviors associated with swallowing.  3. Follow swallow guidelines including sit upright for all PO intake, use good oral hygiene  and remain upright for at least 1-2 hours following any PO intake.  4. Follow up Modified Barium Swallow Study should be completed as needed.   5. Follow up with Gastroenterologist secondary to delayed emptying of esophagus/esophageal dysfunction.     Please contact [facility] at [phone] if there are questions re: the above or if we can be of additional service to this patient.      Kaley Romeo M.A. CF-SLP  Speech Language Pathologist  10/12/2020

## 2020-10-21 ENCOUNTER — PATIENT MESSAGE (OUTPATIENT)
Dept: RHEUMATOLOGY | Facility: CLINIC | Age: 85
End: 2020-10-21

## 2020-10-23 ENCOUNTER — TELEPHONE (OUTPATIENT)
Dept: OTOLARYNGOLOGY | Facility: CLINIC | Age: 85
End: 2020-10-23

## 2020-10-23 ENCOUNTER — CLINICAL SUPPORT (OUTPATIENT)
Dept: SPEECH THERAPY | Facility: HOSPITAL | Age: 85
End: 2020-10-23
Attending: ORTHOPAEDIC SURGERY
Payer: MEDICARE

## 2020-10-23 DIAGNOSIS — R13.13 PHARYNGEAL DYSPHAGIA: Primary | ICD-10-CM

## 2020-10-23 DIAGNOSIS — R13.13 PHARYNGEAL DYSPHAGIA: ICD-10-CM

## 2020-10-23 PROCEDURE — 92610 EVALUATE SWALLOWING FUNCTION: CPT | Performed by: SPEECH-LANGUAGE PATHOLOGIST

## 2020-10-23 NOTE — PROGRESS NOTES
"Speech and Language Therapy Evaluation    Date: 10/23/2020     Name: Mars Armenta   MRN: [id number]    Therapy Diagnosis: Pharyngeal dysphagia Physician: Shiela Miller MD  Physician Orders: Ambulatory referral/consult to speech therapy  Medical Diagnosis: Pharyngeal dysphagia     Visit # / Visits Authorized:  1 / 12   Date of Evaluation:  10/23/2020   Insurance Authorization Period: 10/23/20-12/23/20  Plan of Care Certification:    10/23/2020 to 04/23/21     Time In: 10:30 AM  Time Out: 11:30 AM   Procedure Min.   Swallow and Oral Function Evaluation   60     Precautions:Standard  Subjective     History of Current Condition:   Mars Armenta is a 89 y.o. male here today for speech, language, and swallowing evaluation. Patient reports that over the past 5 years, he has noticed gradual dysphagia symptoms including the feeling of food "getting stuck" in his throat for nearly all meals. Additionally, patient reports coughing to clear feeling of food getting stuck which often doesn't clear the sensation. Patient reports sometimes he is able to "get food up" and spit it out. Patient reports no weight loss secondary to dysphagia symptoms. Patient reports that he has researched dysphagia and therefore has adopted a behavior of swallowing several times per bolus to clear the food that is "stuck in his throat."     Patient reports he had a stroke in April 2019; however, upon chart review, patient was admitted to [facility] on April 13, 2019, secondary to an acute kidney infection with transient altered mental status with no acute abnormalities seen on CT or MRI. Patient reports he was discharged one day later as most symptoms had resolved. Patient's wife reports that she maintains healthy meals for patient including chicken, fish, vegetables, and herbs and no red meat secondary to patient's arthritis pain/inflammation. Patient reports he eats three regulated meals at 6 am, 11:30 am, and 5:30 pm each day.       Past Medical " "History: Mars Armenta  has no past medical history on file.  Mars Armenta  has no past surgical history on file.  Medical Hx and Allergies: Mars has a current medication list which includes the following prescription(s): eliquis, gabapentin, hydroxychloroquine, ipratropium, latanoprost, tetrahydrozoline hcl/zinc sulf, and timolol.   Review of patient's allergies indicates:   Allergen Reactions    Baclofen Other (See Comments)     Pain & Dizziness    Furosemide Other (See Comments)     Fluid build up in lungs    Meloxicam Other (See Comments)     Fluid build up in lungs    Naproxen Other (See Comments)     Pain & Dizziness    Tizanidine Other (See Comments)     Pain & Dizziness     Prior Therapy: speech therapy was code switching after stroke in April 2019   Social History:  Lives in [place] with his wife; 4 children   Pain:  0/10  Pain Location / Description: N/A; pain in bilateral legs with increased time or speed of walking   Nutrition: Avoiding fats or foods difficult to digest   Patient's Therapy Goals: "avoid having trouble to swallow to reduce irritation in his swallow as well as his vocal quality and to reduce these behaviors when eating with others"   Objective     Clinical Swallow Exam/Inessa Mechanism Exam:  Oral Motor Exam:  Mandibular Strength and Mobility - Trigeminal Nerve (CNV) Rest: Symmetrical   Lateralization: WFL  Protrusion: WFL  Retraction:  WFL  Involuntary Movement: absent   Strength and ROM: WFL   Oral Labial Strength and Mobility -Facial Nerve (CN VII)  Rest: Symmetrical   Lateralization: WFL  Protrusion: WFL  Retraction:  WFL  Involuntary Movement: absent   Strength and ROM: WFL   Lingual Strength and Mobility- Hypoglossal Nerve (CN XII)  Rest: Symmetrical   Lateralization: WFL  Protrusion: WFL  Retraction:  WFL  Involuntary Movement: absent   Strength and ROM: WFL   Velar Elevation - Glossopharyngeal Nerve (CN IX) and Vagus (CN X) Rest: Symmetrical   Symmetry with Short " "Production of "ah": Symmetrial   Involuntary Movement: absent    Buccal Strength and Mobility - Facial Nerve (CN VII)  WFL    Facial Sensation and Movement - Facial Nerve (CN VII) Symmetrical at rest: yes  Wrinkle forehead: yes  Able to close eyes tightly: yes  Taste to Anterior 2/3 of Tongue: yes     Structure Abnormalities: yes  Dentition: Adequate  Secretion Management: WFL  Mucosal Quality: WFL  Volitional Cough: WFL  Volitional Swallow: WFL  Voice Prior to PO Intake: hoarse, presbyphonia     EAT-10 Score:    EATING ASSESSMENT TOOL (EAT-10) is a questionnaire given to the patient to  his swallowing function. [facility] ranked his swallowing function as a 17/40. A score that is greater than 3 may be indicative of swallowing problems.    [place] Swallow Protocol  [place] Swallow Protocol dictates pt remain NPO if fail screener; (Gerardor et al. 2014) however, as objective swallow assessment is not available for greater than a week, pt will remain on current diet until objective assessment is completed unless otherwise indicated.     The [place] Swallow Protocol was administered. The pt was alert and provided the instructions prior to the beginning of the protocol. The pt consumed 3 oz before putting the cup down. Pt drank with consecutive swallows. Pt with immediate cough and watery eyes.     Clinical Swallow Examination:   It is to be noted, patient self-fed throughout evaluation. Patient presented with:   THIN:-3 oz water test; Patient with immediate cough and watery eyes.     PUREE:- tsp bites of pudding x6; Patient with immediate throat clearing after the swallow. Patient required 2 swallows per bolus.     SOLID: -bite of cookie x6; Patient with immediate throat clearing; patient required 2-3 swallows per bolus. Mastication time WFL.       OVERALL IMPRESSION:   Clinical swallow evaluation revealed oral phase within functional limits characterized by lingual, labial, an buccal strength and range of motion within " functional limits for bolus preparation and propulsion as evidenced by no oral residue and mastication time WFL. Pharyngeal phase characterized by suspected reduced base of tongue retraction along with hyolaryngeal excursion as evidenced by 2-3 swallows required per bolus for puree and solid consistencies. Patient demonstrated consistent throat clearing/coughing throughout the evaluation.     MBSS interpretation 10/12/20:  Modified Barium Swallow Study (MBSS) revealed oral phase characterized by lingual and labial strength and range of motion within functional limits for bolus preparation and propulsion as evidenced by mastication time WFL and no oral residue. During pharyngeal swallow, secondary to generalized weakness, decreased base of tongue retraction and hyolaryngeal excursion resulted in trace-mild residue at the valleculae and pyriform sinuses which was not cleared with spontaneous sequential swallows. No penetration or aspiration were viewed in today's study. Esophageal phase characterized by reduced clearance of bolus into stomach following 30 second delay. It is to be noted, patient presents with a curved cervical spinal column which reduces esophageal space and a cricopharyngeal prominence at the level of C4/C5; however, this appears to impact overall swallow function. Also of note, patient demonstrates several negative compensatory behaviors including repetitive swallows (3-4 per bolus) and throat clearing which negatively impact swallow function and create fatigue as the meal progresses. Negative compensatory behaviors also negatively impact esophageal phase as consistent throat clearing brings bolus back up. Consistent throat clearing likely also contributes to patient reported voice changes. It is recommended patient follow-up with GI secondary to delayed emptying of esophagus and participate in speech therapy to reduce negative compensatory behaviors associated with swallowing.     [facility] National  Outcome Measures System (NOMS):   LEVEL 5: Swallowing is safe with minimal diet restriction and/or occasionally requires minimal cueing to use compensatory strategies. The individual may occasionally self-cue. All nutrition and hydration needs are met by mouth at mealtime.      Treatment   Treatment Time In: 11:00 AM  Treatment Time Out: 11:30 AM  Total Treatment Time: 30 minutes    Education: Plan of Care, role of SLP in care, aspiration precautions  and anatomy and physiology of swallow mechanism, MBSS findings, negative behaviors and their impact on overall swallow function and efficiency were discussed with patient and his wife. Patient and family members expressed understanding. All questions were answered.     Home Program: Patient instructed to begin bringing awareness to his swallow as well as associated behaviors including throat clearing and repetitive swallows.     Assessment     Rehab Potential: good  Pt's spiritual, cultural and educational needs considered and pt agreeable to plan of care and goals.    Short Term Goals (3 months):    Patient will have safe ingestion of small meals (4-5) of regular diet with liquids ad sophie throughout the day with min cues to follow swallowing recommendations (I.e., ease of swallow, sitting upright 20-30 minutes after each meal, liquid wash).    Patient will identify negative swallowing compensatory strategies with initial cue only and 80% accuracy.   Patient will eliminate negative swallowing compensatory strategies with 80% accuracy and moderate cueing.    Patient will keep dysphagia journal (weight log and food diary) to identify dysphagia symptoms and fatigue as meals progress.    Patient will monitor pulse ox during meal times to identify periods of de-satting secondary to fatigue and/or use of negative compensatory strategies with 80% accuracy.    Patient and his family will participate in dysphagia education including aspiration precautions, oral phase vs  pharyngeal phase dysphagia, and effect of negative compensatory strategies as it relates to his swallow with returned demonstration of information.   Patient will implement safe eating strategies with initial cue only and 80% accuracy including small bites and sips, use of liquid wash as needed.    Long Term Goals (6 months):   Patient will maintain adequate hydration/nutrition with optimum safety and efficiency of swallowing function on PO intake without overt signs and symptoms of aspiration for regular (IDDSI 7)/thin liquid (IDDSI 0) diet level.       Plan     Plan of Care Certification Period: 10/23/2020  to 04/23/21    Recommended Treatment Plan:  Patient will participate in the [facility] speech therapy 1 times per week to address his  Swallow deficits, to educate patient and their family, and to participate in a home exercise program.    Other Recommendations: Contact SLP with any questions or concerns. Will contact MD regarding nutrition referral at request of patient's wife secondary to requested diet recommendations to eliminate arthritis pain.     Kalye Romeo M.A. CF-SLP  Speech Language Pathologist  10/23/2020

## 2020-10-23 NOTE — TELEPHONE ENCOUNTER
I tried to place the referral, but it is not coming up as a covered diagnosis.  He may wish to call his PCP to see if he has any alternate diagnoses.

## 2020-10-23 NOTE — TELEPHONE ENCOUNTER
----- Message from Kaley Romeo CCC-SLP sent at 10/23/2020 11:38 AM CDT -----  Regarding: Nutrition Referral  Dr. Miller,    I am currently seeing this patient for therapy, and he and his wife had several questions today about diet and food recommendations to reduce inflammation secondary to arthritis. The patient's wife asked if they could see a dietician. Would you mind placing a referral to nutrition for him. I believe it is REF50.    Thanks so much!  Kaley

## 2020-10-30 ENCOUNTER — CLINICAL SUPPORT (OUTPATIENT)
Dept: SPEECH THERAPY | Facility: HOSPITAL | Age: 85
End: 2020-10-30
Attending: ORTHOPAEDIC SURGERY
Payer: MEDICARE

## 2020-10-30 DIAGNOSIS — R13.13 PHARYNGEAL DYSPHAGIA: Primary | ICD-10-CM

## 2020-10-30 PROCEDURE — 92526 ORAL FUNCTION THERAPY: CPT | Performed by: SPEECH-LANGUAGE PATHOLOGIST

## 2020-10-30 NOTE — PROGRESS NOTES
Speech and Language Therapy Treatment Note  Date:  10/30/2020     Name: Mars Armenta   MRN: [id number]   Therapy Diagnosis: Physician: Shiela Miller MD  Physician Orders: Ambulatory referral/consult to speech therapy   Medical Diagnosis: Pharyngeal dysphagia     Visit #/Visits authorized: 2/12  Date of Evaluation: 10/23/20  Insurance Authorization Period: 10/23/20-12/23/20  Plan of Care Expiration Date:  04/23/21  Extended POC: N/A    Time In: 10:30 AM  Time Out:  11:30 AM  Total Billable Time: 60 mins     Precautions: Standard    Subjective:   Pt reports: everything is going well. Patient reports no changes regarding his swallow this week.   He  was compliant to home exercise program.     Pain Scale:  0/10 on VAS currently.   Pain Location: N/A  Objective:   TIMED  Procedure Min.   N/A    0         UNTIMED  Procedure Min.   Dysphagia Therapy 60   Total Timed Units: 0  Total Untimed Units: 4  Charges Billed/# of units: 55370/4    Short Term Goals: (3 months) Current Progress:   Patient will have safe ingestion of small meals (4-5) of regular diet with liquids ad sophie throughout the day with min cues to follow swallowing recommendations (I.e., ease of swallow, sitting upright 20-30 minutes after each meal, liquid wash).  Patient educated regarding rationale for implementation of smaller meals more frequently throughout the day to reduce discomfort associated with delayed esophageal emptying as observed on the MBSS; however, patient reports this is not that great of a concern to him; however, he is willing to try implementing this strategy. Patient implemented swallowing recommendations, sitting upright, using liquid wash with minimal cueing during food trials in today's session.      Patient will identify negative swallowing compensatory strategies with initial cue only and 80% accuracy. Patient identified negative compensatory swallow strategies with 90% accuracy and minimal verbal cueing. Patient identified double  swallowing and throat clearing after initial cue and presentation of information. Patient required minimal cueing to identify double swallows for some solid consistency boluses.     Patient will eliminate negative swallowing compensatory strategies with 80% accuracy and moderate cueing.  Patient eliminated negative compensatory swallowing strategies with 70% accuracy and moderate verbal cueing. Patient introduced to strategies of eliminating double swallow by swallowing once per bolus and using liquid wash to clear any additional oral or pharyngeal residue. Patient also cued to double swallow or use liquid wash consistently for every 3-4 bites to increase use of strategy and generalize to all mealtimes. Patient implemented strategies and eliminated double swallowing and throat clearing for bites of yogurt and brownie with water liquid wash for 16/20 bites in today's session. Patient also cued to take smaller bites of solid consistencies (brownie) to facilitate natural implementation of one swallow/bolus.     Patient will monitor pulse ox during meal times to identify periods of de-satting secondary to fatigue and/or use of negative compensatory strategies with 80% accuracy.  Not formally addressed      Patient and his family will participate in dysphagia education including aspiration precautions, oral phase vs pharyngeal phase dysphagia, and effect of negative compensatory strategies as it relates to his swallow with returned demonstration of information. Patient and his wife participated in dysphagia education including aspiration precautions, oral, pharyngeal, vs esophageal dysphagia as each phase relates to his swallow and findings on MBSS as well as effect of negative compensatory behaviors on fatigue during the meal. Patient and his wife returned demonstration of information as evidenced by asking good questions throughout the session.      Patient will correctly demonstrate pharyngeal swallow strengthening  exercise (Roma/tongue hold) on 10/10 trials. Patient introduced to tongue base strengthening exercise with rationale for use of exercise to strengthen tongue base and demonstrated exercise with 80% accuracy in 8/10 trials with moderate cueing for use of exercise.      Patient Education/Response:   Patient and his wife participated in dysphagia education including aspiration precautions, oral, pharyngeal, vs esophageal dysphagia as each phase relates to his swallow and findings on MBSS as well as effect of negative compensatory behaviors on fatigue during the meal. Patient and his wife returned demonstration of information as evidenced by asking good questions throughout the session. All questions were answered.     Written Home Exercises Provided: Patient instructed to cont prior HEP.  Exercises were reviewed and [facility] was able to demonstrate them prior to the end of the session.  [facility] demonstrated good  understanding of the education provided.     See EMR under Media for exercises provided 10/30/2020.  Assessment:   [facility] is progressing well towards his goals. Current goals remain appropriate. Goals to be updated as necessary.     Pt prognosis is Excellent. Pt will continue to benefit from skilled outpatient speech and language therapy to address the deficits listed in the problem list on initial evaluation, provide pt/family education and to maximize pt's level of independence in the home and community environment.     Barriers to Therapy:   Pt's spiritual, cultural and educational needs considered and pt agreeable to plan of care and goals.     Plan:   Continue POC with focus on dysphagia education in addition to elimination of negative compensatory behaviors associated with swallow.     Kaley Romeo M.A. CF-SLP  Speech Language Pathologist  10/30/2020

## 2020-10-30 NOTE — PATIENT INSTRUCTIONS
Roma (Tongue Hold) Exercise  Goal: In order to efficiently push our food down to our esophagus, we must have a strong base of tongue. This exercise is aimed at strengthening the muscles at the base of the tongue.  Directions:  1. Stick out your tongue slightly from your mouth.   2. Hold your tongue between your front teeth.   3. Swallow while keeping your tongue between your front teeth.     **Remember: This exercise is completed most easily with a moist mouth, so take sips of water in between repetitions of the exercise. DO NOT complete the exercise with food or liquid in your mouth.

## 2020-11-03 ENCOUNTER — TELEPHONE (OUTPATIENT)
Dept: RHEUMATOLOGY | Facility: CLINIC | Age: 85
End: 2020-11-03

## 2020-11-03 DIAGNOSIS — R62.7 ADULT FAILURE TO THRIVE SYNDROME: ICD-10-CM

## 2020-11-03 DIAGNOSIS — Z71.9 HEALTH EDUCATION/COUNSELING: Primary | ICD-10-CM

## 2020-11-27 ENCOUNTER — CLINICAL SUPPORT (OUTPATIENT)
Dept: SPEECH THERAPY | Facility: HOSPITAL | Age: 85
End: 2020-11-27
Attending: ORTHOPAEDIC SURGERY
Payer: MEDICARE

## 2020-11-27 DIAGNOSIS — R13.13 PHARYNGEAL DYSPHAGIA: Primary | ICD-10-CM

## 2020-11-27 PROCEDURE — 92526 ORAL FUNCTION THERAPY: CPT | Performed by: SPEECH-LANGUAGE PATHOLOGIST

## 2020-11-27 NOTE — PROGRESS NOTES
"Speech and Language Therapy Treatment Note/Discharge Note  Date:  11/27/2020     Name: Mars Armenta   MRN: [id number]   Therapy Diagnosis: Physician: Shiela Miller MD  Physician Orders: Ambulatory referral/consult to speech therapy   Medical Diagnosis: Pharyngeal dysphagia     Visit #/Visits authorized: 3/12  Date of Evaluation: 10/23/20  Insurance Authorization Period: 10/23/20-12/23/20  Plan of Care Expiration Date:  04/23/21  Extended POC: N/A    Date of Last visit: 11/27/2020   Total Visits Received: 3  Cancelled Visits: 2  No Show Visits: 0    Time In: 10:30 AM  Time Out:  11:30 AM  Total Billable Time: 60 mins     Precautions: Standard    Subjective:   Pt reports: everything is going well. Patient reports his swallow is overall "better" and he feels him being more careful while swallowing has been beneficial.  He  was compliant to home exercise program.     Pain Scale:  0/10 on VAS currently.   Pain Location: N/A  Objective:   TIMED  Procedure Min.   N/A    0         UNTIMED  Procedure Min.   Dysphagia Therapy 60   Total Timed Units: 0  Total Untimed Units: 4  Charges Billed/# of units: 57898/4    Short Term Goals: (3 months) Current Progress:   Patient will have safe ingestion of small meals (4-5) of regular diet with liquids ad sophie throughout the day with min cues to follow swallowing recommendations (I.e., ease of swallow, sitting upright 20-30 minutes after each meal, liquid wash).  Patient educated regarding rationale for implementation of smaller meals more frequently throughout the day to reduce discomfort associated with delayed esophageal emptying as observed on the MBSS; however, patient reports this is not that great of a concern to him; however, he is willing to try implementing this strategy. Patient implemented swallowing recommendations, sitting upright, using liquid wash with minimal cueing during food trials in today's session.      Patient will identify negative swallowing compensatory " strategies with initial cue only and 80% accuracy. Patient identified negative compensatory swallow strategies with 90% accuracy and minimal verbal cueing. Patient identified double swallowing and throat clearing after initial cue and presentation of information. Patient required minimal cueing to identify throat clearing after the swallow for some solid consistency boluses consumed in today's session.     Patient will eliminate negative swallowing compensatory strategies with 80% accuracy and moderate cueing.  Patient eliminated negative compensatory swallowing strategies with 90% accuracy and minimal verbal cueing. Patient reminded of strategies of eliminating double swallow by swallowing once per bolus and using liquid wash to clear any additional oral or pharyngeal residue. Patient also cued to double swallow or use liquid wash consistently for every 3-4 bites to increase use of strategy and generalize to all mealtimes. Patient implemented strategies and eliminated double swallowing and throat clearing for bites of crackers and sips of water 18/20 bites in today's session. Patient also encouraged to implement an effortful swallow to reduce residue as well as throughout his day to increase swallow efficiency.      Patient and his family will participate in dysphagia education including aspiration precautions, oral phase vs pharyngeal phase dysphagia, and effect of negative compensatory strategies as it relates to his swallow with returned demonstration of information. Patient and his wife participated in dysphagia education including aspiration precautions, oral, pharyngeal, vs esophageal dysphagia as each phase relates to his swallow and findings on MBSS as well as effect of negative compensatory behaviors on fatigue during the meal. Patient and his wife returned demonstration of information as evidenced by asking good questions throughout the session.      Patient will correctly demonstrate pharyngeal swallow  strengthening exercise (Roma and effortful swallow) on 10/10 trials. Patient introduced to tongue base strengthening exercise with rationale for use of exercise in addition to overall swallow strengthening exercise (effortful swallow) with 80% accuracy in 8/10 trials with minimal cueing for use of exercise.      Patient Education/Response:   Patient and his wife participated in dysphagia education including aspiration precautions, oral, pharyngeal, vs esophageal dysphagia as each phase relates to his swallow and findings on MBSS as well as effect of negative compensatory behaviors on fatigue during the meal. Patient and his wife returned demonstration of information as evidenced by asking good questions throughout the session. All questions were answered.     Written Home Exercises Provided: Patient instructed to cont prior HEP.  Exercises were reviewed and [facility] was able to demonstrate them prior to the end of the session.  [facility] demonstrated good  understanding of the education provided.     See EMR under Media for exercises provided 10/30/2020.    Assessment:     Assessment of Current Status: Patient has demonstrated good knowledge of education provided as well as has met all goals of increasing education and eliminating negative compensatory strategies in addition to implementing swallow strengthening exercises to improve efficiency of swallow function.     Discharge reason: Patient has met all of his goals.    Barriers to Therapy:   Pt's spiritual, cultural and educational needs considered and pt agreeable to plan of care and goals.     Plan:   This patient is discharged from Speech Therapy. Patient encouraged to continue swallow exercises and strategies as well as follow-up should his swallow status change.     Kaley Romeo M.A. CF-SLP  Speech Language Pathologist  11/27/2020

## 2021-01-07 ENCOUNTER — PATIENT MESSAGE (OUTPATIENT)
Dept: RHEUMATOLOGY | Facility: CLINIC | Age: 86
End: 2021-01-07

## 2021-01-07 ENCOUNTER — PATIENT MESSAGE (OUTPATIENT)
Dept: ADMINISTRATIVE | Facility: OTHER | Age: 86
End: 2021-01-07

## 2021-01-12 ENCOUNTER — IMMUNIZATION (OUTPATIENT)
Dept: INTERNAL MEDICINE | Facility: CLINIC | Age: 86
End: 2021-01-12
Payer: MEDICARE

## 2021-01-12 DIAGNOSIS — Z23 NEED FOR VACCINATION: ICD-10-CM

## 2021-01-12 PROCEDURE — 91300 COVID-19, MRNA, LNP-S, PF, 30 MCG/0.3 ML DOSE VACCINE: CPT | Mod: PBBFAC | Performed by: FAMILY MEDICINE

## 2021-01-13 ENCOUNTER — PATIENT MESSAGE (OUTPATIENT)
Dept: RHEUMATOLOGY | Facility: CLINIC | Age: 86
End: 2021-01-13

## 2021-01-13 ENCOUNTER — TELEPHONE (OUTPATIENT)
Dept: RHEUMATOLOGY | Facility: CLINIC | Age: 86
End: 2021-01-13

## 2021-01-29 ENCOUNTER — OFFICE VISIT (OUTPATIENT)
Dept: RHEUMATOLOGY | Facility: CLINIC | Age: 86
End: 2021-01-29
Payer: MEDICARE

## 2021-01-29 VITALS
HEIGHT: 69 IN | WEIGHT: 148.56 LBS | DIASTOLIC BLOOD PRESSURE: 74 MMHG | BODY MASS INDEX: 22 KG/M2 | HEART RATE: 55 BPM | SYSTOLIC BLOOD PRESSURE: 131 MMHG

## 2021-01-29 DIAGNOSIS — Z71.89 COUNSELING ON HEALTH PROMOTION AND DISEASE PREVENTION: ICD-10-CM

## 2021-01-29 DIAGNOSIS — E11.9 TYPE 2 DIABETES MELLITUS WITHOUT COMPLICATION, WITHOUT LONG-TERM CURRENT USE OF INSULIN: ICD-10-CM

## 2021-01-29 DIAGNOSIS — M15.9 PRIMARY OSTEOARTHRITIS INVOLVING MULTIPLE JOINTS: ICD-10-CM

## 2021-01-29 DIAGNOSIS — M05.9 SEROPOSITIVE RHEUMATOID ARTHRITIS: Primary | ICD-10-CM

## 2021-01-29 PROCEDURE — 1126F PR PAIN SEVERITY QUANTIFIED, NO PAIN PRESENT: ICD-10-PCS | Mod: S$GLB,,, | Performed by: INTERNAL MEDICINE

## 2021-01-29 PROCEDURE — 99999 PR PBB SHADOW E&M-EST. PATIENT-LVL IV: ICD-10-PCS | Mod: PBBFAC,,, | Performed by: INTERNAL MEDICINE

## 2021-01-29 PROCEDURE — 99214 OFFICE O/P EST MOD 30 MIN: CPT | Mod: S$GLB,,, | Performed by: INTERNAL MEDICINE

## 2021-01-29 PROCEDURE — 1101F PR PT FALLS ASSESS DOC 0-1 FALLS W/OUT INJ PAST YR: ICD-10-PCS | Mod: CPTII,S$GLB,, | Performed by: INTERNAL MEDICINE

## 2021-01-29 PROCEDURE — 3288F PR FALLS RISK ASSESSMENT DOCUMENTED: ICD-10-PCS | Mod: CPTII,S$GLB,, | Performed by: INTERNAL MEDICINE

## 2021-01-29 PROCEDURE — 1159F PR MEDICATION LIST DOCUMENTED IN MEDICAL RECORD: ICD-10-PCS | Mod: S$GLB,,, | Performed by: INTERNAL MEDICINE

## 2021-01-29 PROCEDURE — 1159F MED LIST DOCD IN RCRD: CPT | Mod: S$GLB,,, | Performed by: INTERNAL MEDICINE

## 2021-01-29 PROCEDURE — 1126F AMNT PAIN NOTED NONE PRSNT: CPT | Mod: S$GLB,,, | Performed by: INTERNAL MEDICINE

## 2021-01-29 PROCEDURE — 99999 PR PBB SHADOW E&M-EST. PATIENT-LVL IV: CPT | Mod: PBBFAC,,, | Performed by: INTERNAL MEDICINE

## 2021-01-29 PROCEDURE — 99214 PR OFFICE/OUTPT VISIT, EST, LEVL IV, 30-39 MIN: ICD-10-PCS | Mod: S$GLB,,, | Performed by: INTERNAL MEDICINE

## 2021-01-29 PROCEDURE — 3288F FALL RISK ASSESSMENT DOCD: CPT | Mod: CPTII,S$GLB,, | Performed by: INTERNAL MEDICINE

## 2021-01-29 PROCEDURE — 1101F PT FALLS ASSESS-DOCD LE1/YR: CPT | Mod: CPTII,S$GLB,, | Performed by: INTERNAL MEDICINE

## 2021-01-29 RX ORDER — HYDROXYCHLOROQUINE SULFATE 200 MG/1
200 TABLET, FILM COATED ORAL 2 TIMES DAILY
COMMUNITY
End: 2021-03-18

## 2021-02-02 ENCOUNTER — IMMUNIZATION (OUTPATIENT)
Dept: INTERNAL MEDICINE | Facility: CLINIC | Age: 86
End: 2021-02-02
Payer: MEDICARE

## 2021-02-02 DIAGNOSIS — Z23 NEED FOR VACCINATION: Primary | ICD-10-CM

## 2021-02-02 PROCEDURE — 91300 COVID-19, MRNA, LNP-S, PF, 30 MCG/0.3 ML DOSE VACCINE: CPT | Mod: PBBFAC | Performed by: FAMILY MEDICINE

## 2021-02-02 PROCEDURE — 0002A COVID-19, MRNA, LNP-S, PF, 30 MCG/0.3 ML DOSE VACCINE: CPT | Mod: PBBFAC | Performed by: FAMILY MEDICINE

## 2021-03-29 ENCOUNTER — PATIENT OUTREACH (OUTPATIENT)
Dept: DIABETES | Facility: CLINIC | Age: 86
End: 2021-03-29

## 2021-03-29 ENCOUNTER — CLINICAL SUPPORT (OUTPATIENT)
Dept: DIABETES | Facility: CLINIC | Age: 86
End: 2021-03-29
Payer: MEDICARE

## 2021-03-29 DIAGNOSIS — E11.9 TYPE 2 DIABETES MELLITUS WITHOUT COMPLICATION, WITHOUT LONG-TERM CURRENT USE OF INSULIN: ICD-10-CM

## 2021-05-28 ENCOUNTER — OFFICE VISIT (OUTPATIENT)
Dept: RHEUMATOLOGY | Facility: CLINIC | Age: 86
End: 2021-05-28
Payer: MEDICARE

## 2021-05-28 VITALS
HEART RATE: 55 BPM | BODY MASS INDEX: 21.1 KG/M2 | WEIGHT: 142.44 LBS | HEIGHT: 69 IN | SYSTOLIC BLOOD PRESSURE: 119 MMHG | DIASTOLIC BLOOD PRESSURE: 68 MMHG

## 2021-05-28 DIAGNOSIS — M05.9 SEROPOSITIVE RHEUMATOID ARTHRITIS: Primary | ICD-10-CM

## 2021-05-28 DIAGNOSIS — M15.9 PRIMARY OSTEOARTHRITIS INVOLVING MULTIPLE JOINTS: ICD-10-CM

## 2021-05-28 DIAGNOSIS — Z71.89 COUNSELING ON HEALTH PROMOTION AND DISEASE PREVENTION: ICD-10-CM

## 2021-05-28 PROCEDURE — 99999 PR PBB SHADOW E&M-EST. PATIENT-LVL III: CPT | Mod: PBBFAC,,, | Performed by: INTERNAL MEDICINE

## 2021-05-28 PROCEDURE — 3288F PR FALLS RISK ASSESSMENT DOCUMENTED: ICD-10-PCS | Mod: CPTII,S$GLB,, | Performed by: INTERNAL MEDICINE

## 2021-05-28 PROCEDURE — 1126F AMNT PAIN NOTED NONE PRSNT: CPT | Mod: S$GLB,,, | Performed by: INTERNAL MEDICINE

## 2021-05-28 PROCEDURE — 3288F FALL RISK ASSESSMENT DOCD: CPT | Mod: CPTII,S$GLB,, | Performed by: INTERNAL MEDICINE

## 2021-05-28 PROCEDURE — 1159F PR MEDICATION LIST DOCUMENTED IN MEDICAL RECORD: ICD-10-PCS | Mod: S$GLB,,, | Performed by: INTERNAL MEDICINE

## 2021-05-28 PROCEDURE — 1126F PR PAIN SEVERITY QUANTIFIED, NO PAIN PRESENT: ICD-10-PCS | Mod: S$GLB,,, | Performed by: INTERNAL MEDICINE

## 2021-05-28 PROCEDURE — 99999 PR PBB SHADOW E&M-EST. PATIENT-LVL III: ICD-10-PCS | Mod: PBBFAC,,, | Performed by: INTERNAL MEDICINE

## 2021-05-28 PROCEDURE — 1100F PR PT FALLS ASSESS DOC 2+ FALLS/FALL W/INJURY/YR: ICD-10-PCS | Mod: CPTII,S$GLB,, | Performed by: INTERNAL MEDICINE

## 2021-05-28 PROCEDURE — 1159F MED LIST DOCD IN RCRD: CPT | Mod: S$GLB,,, | Performed by: INTERNAL MEDICINE

## 2021-05-28 PROCEDURE — 99214 OFFICE O/P EST MOD 30 MIN: CPT | Mod: S$GLB,,, | Performed by: INTERNAL MEDICINE

## 2021-05-28 PROCEDURE — 1100F PTFALLS ASSESS-DOCD GE2>/YR: CPT | Mod: CPTII,S$GLB,, | Performed by: INTERNAL MEDICINE

## 2021-05-28 PROCEDURE — 99214 PR OFFICE/OUTPT VISIT, EST, LEVL IV, 30-39 MIN: ICD-10-PCS | Mod: S$GLB,,, | Performed by: INTERNAL MEDICINE

## 2021-05-28 RX ORDER — NEOMYCIN SULFATE, POLYMYXIN B SULFATE, AND DEXAMETHASONE 3.5; 10000; 1 MG/G; [USP'U]/G; MG/G
OINTMENT OPHTHALMIC
COMMUNITY
Start: 2021-05-24

## 2021-05-28 RX ORDER — TIMOLOL MALEATE 5 MG/ML
SOLUTION/ DROPS OPHTHALMIC
COMMUNITY
Start: 2021-05-04

## 2021-06-16 ENCOUNTER — PATIENT MESSAGE (OUTPATIENT)
Dept: RHEUMATOLOGY | Facility: CLINIC | Age: 86
End: 2021-06-16

## 2021-07-14 ENCOUNTER — PATIENT MESSAGE (OUTPATIENT)
Dept: RHEUMATOLOGY | Facility: CLINIC | Age: 86
End: 2021-07-14

## 2021-09-02 ENCOUNTER — TELEPHONE (OUTPATIENT)
Dept: RHEUMATOLOGY | Facility: CLINIC | Age: 86
End: 2021-09-02

## 2021-09-03 ENCOUNTER — OFFICE VISIT (OUTPATIENT)
Dept: RHEUMATOLOGY | Facility: CLINIC | Age: 86
End: 2021-09-03
Payer: MEDICARE

## 2021-09-03 VITALS
HEART RATE: 54 BPM | DIASTOLIC BLOOD PRESSURE: 61 MMHG | HEIGHT: 69 IN | BODY MASS INDEX: 21.03 KG/M2 | SYSTOLIC BLOOD PRESSURE: 121 MMHG

## 2021-09-03 DIAGNOSIS — Z71.89 COUNSELING ON HEALTH PROMOTION AND DISEASE PREVENTION: ICD-10-CM

## 2021-09-03 DIAGNOSIS — M05.9 SEROPOSITIVE RHEUMATOID ARTHRITIS: Primary | ICD-10-CM

## 2021-09-03 DIAGNOSIS — M15.9 PRIMARY OSTEOARTHRITIS INVOLVING MULTIPLE JOINTS: ICD-10-CM

## 2021-09-03 PROCEDURE — 99999 PR PBB SHADOW E&M-EST. PATIENT-LVL IV: ICD-10-PCS | Mod: PBBFAC,,, | Performed by: INTERNAL MEDICINE

## 2021-09-03 PROCEDURE — 3288F FALL RISK ASSESSMENT DOCD: CPT | Mod: CPTII,S$GLB,, | Performed by: INTERNAL MEDICINE

## 2021-09-03 PROCEDURE — 1126F PR PAIN SEVERITY QUANTIFIED, NO PAIN PRESENT: ICD-10-PCS | Mod: CPTII,S$GLB,, | Performed by: INTERNAL MEDICINE

## 2021-09-03 PROCEDURE — 3288F PR FALLS RISK ASSESSMENT DOCUMENTED: ICD-10-PCS | Mod: CPTII,S$GLB,, | Performed by: INTERNAL MEDICINE

## 2021-09-03 PROCEDURE — 1160F PR REVIEW ALL MEDS BY PRESCRIBER/CLIN PHARMACIST DOCUMENTED: ICD-10-PCS | Mod: CPTII,S$GLB,, | Performed by: INTERNAL MEDICINE

## 2021-09-03 PROCEDURE — 1101F PT FALLS ASSESS-DOCD LE1/YR: CPT | Mod: CPTII,S$GLB,, | Performed by: INTERNAL MEDICINE

## 2021-09-03 PROCEDURE — 1126F AMNT PAIN NOTED NONE PRSNT: CPT | Mod: CPTII,S$GLB,, | Performed by: INTERNAL MEDICINE

## 2021-09-03 PROCEDURE — 1101F PR PT FALLS ASSESS DOC 0-1 FALLS W/OUT INJ PAST YR: ICD-10-PCS | Mod: CPTII,S$GLB,, | Performed by: INTERNAL MEDICINE

## 2021-09-03 PROCEDURE — 99214 PR OFFICE/OUTPT VISIT, EST, LEVL IV, 30-39 MIN: ICD-10-PCS | Mod: S$GLB,,, | Performed by: INTERNAL MEDICINE

## 2021-09-03 PROCEDURE — 99214 OFFICE O/P EST MOD 30 MIN: CPT | Mod: S$GLB,,, | Performed by: INTERNAL MEDICINE

## 2021-09-03 PROCEDURE — 1159F PR MEDICATION LIST DOCUMENTED IN MEDICAL RECORD: ICD-10-PCS | Mod: CPTII,S$GLB,, | Performed by: INTERNAL MEDICINE

## 2021-09-03 PROCEDURE — 99999 PR PBB SHADOW E&M-EST. PATIENT-LVL IV: CPT | Mod: PBBFAC,,, | Performed by: INTERNAL MEDICINE

## 2021-09-03 PROCEDURE — 1160F RVW MEDS BY RX/DR IN RCRD: CPT | Mod: CPTII,S$GLB,, | Performed by: INTERNAL MEDICINE

## 2021-09-03 PROCEDURE — 1159F MED LIST DOCD IN RCRD: CPT | Mod: CPTII,S$GLB,, | Performed by: INTERNAL MEDICINE

## 2021-09-03 RX ORDER — TRIAMCINOLONE ACETONIDE 0.25 MG/G
CREAM TOPICAL
COMMUNITY
Start: 2020-09-04

## 2021-09-03 RX ORDER — BRIMONIDINE TARTRATE 1 MG/ML
SOLUTION/ DROPS OPHTHALMIC
COMMUNITY

## 2022-02-17 ENCOUNTER — TELEPHONE (OUTPATIENT)
Dept: RHEUMATOLOGY | Facility: CLINIC | Age: 87
End: 2022-02-17
Payer: MEDICARE

## 2022-02-18 ENCOUNTER — OFFICE VISIT (OUTPATIENT)
Dept: RHEUMATOLOGY | Facility: CLINIC | Age: 87
End: 2022-02-18
Payer: MEDICARE

## 2022-02-18 VITALS
WEIGHT: 145.5 LBS | HEART RATE: 56 BPM | DIASTOLIC BLOOD PRESSURE: 76 MMHG | BODY MASS INDEX: 21.55 KG/M2 | HEIGHT: 69 IN | SYSTOLIC BLOOD PRESSURE: 137 MMHG

## 2022-02-18 DIAGNOSIS — M81.0 AGE-RELATED OSTEOPOROSIS WITHOUT CURRENT PATHOLOGICAL FRACTURE: ICD-10-CM

## 2022-02-18 DIAGNOSIS — Z71.89 COUNSELING ON HEALTH PROMOTION AND DISEASE PREVENTION: ICD-10-CM

## 2022-02-18 DIAGNOSIS — M15.9 PRIMARY OSTEOARTHRITIS INVOLVING MULTIPLE JOINTS: ICD-10-CM

## 2022-02-18 DIAGNOSIS — M05.9 SEROPOSITIVE RHEUMATOID ARTHRITIS: Primary | ICD-10-CM

## 2022-02-18 PROCEDURE — 1101F PR PT FALLS ASSESS DOC 0-1 FALLS W/OUT INJ PAST YR: ICD-10-PCS | Mod: CPTII,S$GLB,, | Performed by: INTERNAL MEDICINE

## 2022-02-18 PROCEDURE — 1126F AMNT PAIN NOTED NONE PRSNT: CPT | Mod: CPTII,S$GLB,, | Performed by: INTERNAL MEDICINE

## 2022-02-18 PROCEDURE — 99999 PR PBB SHADOW E&M-EST. PATIENT-LVL III: CPT | Mod: PBBFAC,,, | Performed by: INTERNAL MEDICINE

## 2022-02-18 PROCEDURE — 99999 PR PBB SHADOW E&M-EST. PATIENT-LVL III: ICD-10-PCS | Mod: PBBFAC,,, | Performed by: INTERNAL MEDICINE

## 2022-02-18 PROCEDURE — 1101F PT FALLS ASSESS-DOCD LE1/YR: CPT | Mod: CPTII,S$GLB,, | Performed by: INTERNAL MEDICINE

## 2022-02-18 PROCEDURE — 1159F PR MEDICATION LIST DOCUMENTED IN MEDICAL RECORD: ICD-10-PCS | Mod: CPTII,S$GLB,, | Performed by: INTERNAL MEDICINE

## 2022-02-18 PROCEDURE — 99214 PR OFFICE/OUTPT VISIT, EST, LEVL IV, 30-39 MIN: ICD-10-PCS | Mod: S$GLB,,, | Performed by: INTERNAL MEDICINE

## 2022-02-18 PROCEDURE — 1126F PR PAIN SEVERITY QUANTIFIED, NO PAIN PRESENT: ICD-10-PCS | Mod: CPTII,S$GLB,, | Performed by: INTERNAL MEDICINE

## 2022-02-18 PROCEDURE — 3288F FALL RISK ASSESSMENT DOCD: CPT | Mod: CPTII,S$GLB,, | Performed by: INTERNAL MEDICINE

## 2022-02-18 PROCEDURE — 1159F MED LIST DOCD IN RCRD: CPT | Mod: CPTII,S$GLB,, | Performed by: INTERNAL MEDICINE

## 2022-02-18 PROCEDURE — 3288F PR FALLS RISK ASSESSMENT DOCUMENTED: ICD-10-PCS | Mod: CPTII,S$GLB,, | Performed by: INTERNAL MEDICINE

## 2022-02-18 PROCEDURE — 99214 OFFICE O/P EST MOD 30 MIN: CPT | Mod: S$GLB,,, | Performed by: INTERNAL MEDICINE

## 2022-02-18 NOTE — PROGRESS NOTES
RHEUMATOLOGY OUTPATIENT CLINIC NOTE    2/18/2022    Attending Rheumatologist: Bola Jolley  Primary Care Provider/Physician Requesting Consultation: Issac Edgar Jr, MD   Chief Complaint/Reason For Consultation:  Osteoarthritis and Rheumatoid Arthritis      Subjective:     Mars Armenta is a 89 y.o. White male with seropositive rheumatoid arthritis for follow-up visit.    Acute complaints.  Intermittent arthralgias and neck pain with prominent mechanical pattern.  Currently without joint swelling or significant pain.  Not using systemic steroids on regular basis.  Denies acute respiratory symptoms, acute /GI complaints.    Review of Systems   Constitutional: Negative for malaise/fatigue.   Eyes: Negative for photophobia and pain.   Musculoskeletal: Positive for falls. Negative for joint pain.   Skin: Positive for rash (History of AK s/p lesion excision and topical therapy).   Neurological: Negative for focal weakness.       Chronic comorbid conditions affecting medical decision making today:  History reviewed. No pertinent past medical history.  History reviewed. No pertinent surgical history.  History reviewed. No pertinent family history.  Social History     Substance and Sexual Activity   Alcohol Use Never     Social History     Tobacco Use   Smoking Status Never Smoker   Smokeless Tobacco Never Used     Social History     Substance and Sexual Activity   Drug Use Not on file       Current Outpatient Medications:     apixaban (ELIQUIS) 2.5 mg Tab, Take 2.5 mg by mouth., Disp: , Rfl:     ipratropium (ATROVENT) 42 mcg (0.06 %) nasal spray, 2 sprays by Nasal route 2 (two) times daily as needed for Rhinitis., Disp: , Rfl:     latanoprost 0.005 % ophthalmic solution, Place 1 drop into both eyes every evening. , Disp: , Rfl:     timolol maleate 0.5% (TIMOPTIC) 0.5 % Drop, , Disp: , Rfl:     brimonidine 0.1% (ALPHAGAN P) 0.1 % Drop, 1 drop into affected eye, Disp: , Rfl:     hydrOXYchloroQUINE  (PLAQUENIL) 200 mg tablet, TAKE 1 TABLET BY MOUTH  TWICE DAILY DO NOT TAKE  MEDICATION ON SUNDAYS (Patient not taking: Reported on 2/18/2022), Disp: 156 tablet, Rfl: 3    neomycin-polymyxin-dexamethasone (DEXACINE) 3.5 mg/g-10,000 unit/g-0.1 % Oint, APPLY A SMALL AMOUNT TO THE AFFECTED EYES THREE TIMES DAILY, Disp: , Rfl:     triamcinolone acetonide 0.025% (KENALOG) 0.025 % cream, 1 application, Disp: , Rfl:      Objective:     Vitals:    02/18/22 1324   BP: 137/76   Pulse: (!) 56     Physical Exam   Eyes: Conjunctivae are normal.   Pulmonary/Chest: Effort normal. No respiratory distress.   Musculoskeletal:         General: No swelling or tenderness. Normal range of motion.       Reviewed available old and all outside pertinent medical records available.    All lab results personally reviewed and interpreted by me.  Lab Results   Component Value Date    WBC 3.8 07/07/2020    HGB 15.3 07/07/2020    HCT 45.8 07/07/2020    MCV 98.9 07/07/2020    RDW 13.6 07/07/2020     (L) 07/07/2020    NEUTROABS 1,436 (L) 07/07/2020       Lab Results   Component Value Date     08/30/2006    K 4.8 08/30/2006     08/30/2006    CO2 26 08/30/2006    GLU 91 08/30/2006    BUN 20 08/30/2006    CALCIUM 9.5 08/30/2006    PROT 7.0 07/18/2006    ALBUMIN 4.6 07/18/2006    BILITOT 0.9 07/18/2006    AST 37 07/18/2006    ALKPHOS 83 07/18/2006    ALT 39 07/18/2006       No results found for: COLORU, APPEARANCEUA, SPECGRAV, PHUR, PHUA, PROTEINUA, GLUCOSEU, KETONESU, BLOODU, LEUKOCYTESUR, NITRITE, UROBILINOGEN    No results found for: PTH    No results found for: URICACID    No results found for: CRP, ERYTHROCYTES    No results found for: ANATITER    No components found for: TSPOTTB,  QUANTIFERON     ASSESSMENT:     Seropositive rheumatoid arthritis without active synovitis.  Previously on Plaquenil as DMARD, discontinue after persistent thrombocytopenia.  No recent labs available, patient reports interval resolution of platelets  since discontinuation of Plaquenil.  No using systemic steroids for symptomatic relief.  Episodic arthralgias with mechanical pattern decade better with rest and Tylenol.  If refractory arthritis with inflammatory features, consider low-dose steroids p.r.n. if diabetes is under control.  Alternatively, consider low-dose sulfasalazine or Arava.  Episodes of mechanical fall and history of poor balance.  Recommend densitometry test to determine fragility fracture risk for which would recommend bone antiresorptive therapy    PLAN:     1. Age-related osteoporosis without current pathological fracture    - DXA Bone Density Spine And Hip; Future    2. Counseling on health promotion and disease prevention      3. Seropositive rheumatoid arthritis      4. Primary osteoarthritis involving multiple joints          Follow up in about 6 months (around 8/18/2022).      Disclaimer: This note is prepared using voice recognition software and as such is likely to have errors and has not been proof read. Please contact me for questions.     30 minutes of total time spent on the encounter, which includes face to face time and non-face to face time preparing to see the patient (eg, review of tests), Obtaining and/or reviewing separately obtained history, Documenting clinical information in the electronic or other health record, Independently interpreting results (not separately reported) and communicating results to the patient/family/caregiver, or Care coordination (not separately reported).     Bola Jolley M.D.

## 2022-02-19 ENCOUNTER — PATIENT MESSAGE (OUTPATIENT)
Dept: RHEUMATOLOGY | Facility: CLINIC | Age: 87
End: 2022-02-19
Payer: MEDICARE

## 2022-02-21 ENCOUNTER — APPOINTMENT (OUTPATIENT)
Dept: RADIOLOGY | Facility: HOSPITAL | Age: 87
End: 2022-02-21
Attending: INTERNAL MEDICINE
Payer: MEDICARE

## 2022-02-21 DIAGNOSIS — M81.0 AGE-RELATED OSTEOPOROSIS WITHOUT CURRENT PATHOLOGICAL FRACTURE: ICD-10-CM

## 2022-02-21 PROCEDURE — 77080 DXA BONE DENSITY AXIAL: CPT | Mod: TC

## 2022-02-21 PROCEDURE — 77080 DXA BONE DENSITY AXIAL: CPT | Mod: 26,,, | Performed by: RADIOLOGY

## 2022-02-21 PROCEDURE — 77080 DEXA BONE DENSITY SPINE HIP: ICD-10-PCS | Mod: 26,,, | Performed by: RADIOLOGY

## 2022-02-22 ENCOUNTER — PATIENT MESSAGE (OUTPATIENT)
Dept: RHEUMATOLOGY | Facility: CLINIC | Age: 87
End: 2022-02-22
Payer: MEDICARE

## 2022-02-22 ENCOUNTER — TELEPHONE (OUTPATIENT)
Dept: RHEUMATOLOGY | Facility: CLINIC | Age: 87
End: 2022-02-22
Payer: MEDICARE

## 2022-02-22 NOTE — TELEPHONE ENCOUNTER
----- Message from Bola Jolley MD sent at 2/22/2022  1:25 PM CST -----  Lower than normal bone density.  Recommend to continue calcium and vitamin-D supplementation.

## 2022-02-22 NOTE — TELEPHONE ENCOUNTER
Spoke to patient and informed him that Dr. Jolley reviewed his dexa scan and that his bone density was a little lower than normal so Dr. Jolley recommended that he tale calcium and vitamin D.  Patient expressed understanding

## 2022-03-23 ENCOUNTER — PATIENT MESSAGE (OUTPATIENT)
Dept: RHEUMATOLOGY | Facility: CLINIC | Age: 87
End: 2022-03-23
Payer: MEDICARE

## 2023-02-03 ENCOUNTER — TELEPHONE (OUTPATIENT)
Dept: OTOLARYNGOLOGY | Facility: CLINIC | Age: 88
End: 2023-02-03
Payer: MEDICARE

## 2023-02-03 NOTE — TELEPHONE ENCOUNTER
----- Message from Malini Hutchinson sent at 2/3/2023 11:13 AM CST -----  Contact: Self - [phone]  Patient called stated he needs some documents signed so he can get them notarized. Please give him a call back at [phone]

## 2023-02-03 NOTE — TELEPHONE ENCOUNTER
"Caller states he has an "oath" for Dr Nassar to sign, regarding a lawsuit. Advised Dr Nassar has no knowledge of such paperwork. Will not sign.   "